# Patient Record
Sex: MALE | Race: BLACK OR AFRICAN AMERICAN | NOT HISPANIC OR LATINO | Employment: UNEMPLOYED | URBAN - METROPOLITAN AREA
[De-identification: names, ages, dates, MRNs, and addresses within clinical notes are randomized per-mention and may not be internally consistent; named-entity substitution may affect disease eponyms.]

---

## 2023-01-23 ENCOUNTER — HOSPITAL ENCOUNTER (INPATIENT)
Facility: HOSPITAL | Age: 63
LOS: 4 days | Discharge: NURSING, REHAB OR LONG-TERM CARE | DRG: 064 | End: 2023-01-27
Attending: EMERGENCY MEDICINE | Admitting: INTERNAL MEDICINE
Payer: MEDICAID

## 2023-01-23 DIAGNOSIS — R41.82 ALTERED MENTAL STATUS, UNSPECIFIED ALTERED MENTAL STATUS TYPE: ICD-10-CM

## 2023-01-23 DIAGNOSIS — N39.0 ACUTE LOWER UTI: ICD-10-CM

## 2023-01-23 DIAGNOSIS — I63.9 ACUTE CVA (CEREBROVASCULAR ACCIDENT): Primary | ICD-10-CM

## 2023-01-23 LAB
AMMONIA PLAS-SCNC: 32 UMOL/L (ref 10–50)
AMPHET+METHAMPHET UR QL: NEGATIVE
BACTERIA #/AREA URNS HPF: ABNORMAL /HPF
BARBITURATES UR QL SCN>200 NG/ML: NEGATIVE
BENZODIAZ UR QL SCN>200 NG/ML: NEGATIVE
BILIRUB UR QL STRIP: NEGATIVE
BZE UR QL SCN: NEGATIVE
CANNABINOIDS UR QL SCN: NEGATIVE
CLARITY UR: ABNORMAL
COLOR UR: YELLOW
CREAT UR-MCNC: 258.5 MG/DL (ref 23–375)
ETHANOL SERPL-MCNC: <10 MG/DL
GLUCOSE UR QL STRIP: NEGATIVE
HGB UR QL STRIP: ABNORMAL
HYALINE CASTS #/AREA URNS LPF: 5 /LPF
KETONES UR QL STRIP: NEGATIVE
LACTATE SERPL-SCNC: 1 MMOL/L (ref 0.5–2.2)
LEUKOCYTE ESTERASE UR QL STRIP: ABNORMAL
METHADONE UR QL SCN>300 NG/ML: NEGATIVE
MICROSCOPIC COMMENT: ABNORMAL
NITRITE UR QL STRIP: POSITIVE
OPIATES UR QL SCN: NEGATIVE
PCP UR QL SCN>25 NG/ML: NEGATIVE
PH UR STRIP: 6 [PH] (ref 5–8)
PROT UR QL STRIP: ABNORMAL
RBC #/AREA URNS HPF: 16 /HPF (ref 0–4)
SP GR UR STRIP: 1.02 (ref 1–1.03)
TOXICOLOGY INFORMATION: NORMAL
URN SPEC COLLECT METH UR: ABNORMAL
UROBILINOGEN UR STRIP-ACNC: ABNORMAL EU/DL
WBC #/AREA URNS HPF: >100 /HPF (ref 0–5)
WBC CLUMPS URNS QL MICRO: ABNORMAL

## 2023-01-23 PROCEDURE — 87088 URINE BACTERIA CULTURE: CPT | Performed by: EMERGENCY MEDICINE

## 2023-01-23 PROCEDURE — 81000 URINALYSIS NONAUTO W/SCOPE: CPT | Mod: 59 | Performed by: EMERGENCY MEDICINE

## 2023-01-23 PROCEDURE — 87077 CULTURE AEROBIC IDENTIFY: CPT | Performed by: EMERGENCY MEDICINE

## 2023-01-23 PROCEDURE — 87086 URINE CULTURE/COLONY COUNT: CPT | Performed by: EMERGENCY MEDICINE

## 2023-01-23 PROCEDURE — 96361 HYDRATE IV INFUSION ADD-ON: CPT

## 2023-01-23 PROCEDURE — 80307 DRUG TEST PRSMV CHEM ANLYZR: CPT | Performed by: EMERGENCY MEDICINE

## 2023-01-23 PROCEDURE — 25000003 PHARM REV CODE 250: Performed by: EMERGENCY MEDICINE

## 2023-01-23 PROCEDURE — 87186 SC STD MICRODIL/AGAR DIL: CPT | Performed by: EMERGENCY MEDICINE

## 2023-01-23 PROCEDURE — 82077 ASSAY SPEC XCP UR&BREATH IA: CPT | Performed by: EMERGENCY MEDICINE

## 2023-01-23 PROCEDURE — 11000001 HC ACUTE MED/SURG PRIVATE ROOM

## 2023-01-23 PROCEDURE — 63600175 PHARM REV CODE 636 W HCPCS: Performed by: EMERGENCY MEDICINE

## 2023-01-23 PROCEDURE — 80053 COMPREHEN METABOLIC PANEL: CPT | Performed by: EMERGENCY MEDICINE

## 2023-01-23 PROCEDURE — 82140 ASSAY OF AMMONIA: CPT | Performed by: EMERGENCY MEDICINE

## 2023-01-23 PROCEDURE — 83605 ASSAY OF LACTIC ACID: CPT | Performed by: EMERGENCY MEDICINE

## 2023-01-23 PROCEDURE — 84484 ASSAY OF TROPONIN QUANT: CPT | Performed by: EMERGENCY MEDICINE

## 2023-01-23 PROCEDURE — 87040 BLOOD CULTURE FOR BACTERIA: CPT | Performed by: EMERGENCY MEDICINE

## 2023-01-23 PROCEDURE — 96365 THER/PROPH/DIAG IV INF INIT: CPT

## 2023-01-23 RX ADMIN — SODIUM CHLORIDE 1000 ML: 0.9 INJECTION, SOLUTION INTRAVENOUS at 09:01

## 2023-01-23 RX ADMIN — CEFTRIAXONE 1 G: 1 INJECTION, POWDER, FOR SOLUTION INTRAMUSCULAR; INTRAVENOUS at 11:01

## 2023-01-24 PROBLEM — I63.9 ACUTE CVA (CEREBROVASCULAR ACCIDENT): Status: ACTIVE | Noted: 2023-01-24

## 2023-01-24 PROBLEM — N39.0 UTI (URINARY TRACT INFECTION): Status: ACTIVE | Noted: 2023-01-24

## 2023-01-24 PROBLEM — I10 UNCONTROLLED HYPERTENSION: Status: ACTIVE | Noted: 2023-01-24

## 2023-01-24 PROBLEM — G93.41 ACUTE METABOLIC ENCEPHALOPATHY: Status: ACTIVE | Noted: 2023-01-24

## 2023-01-24 PROBLEM — N17.9 AKI (ACUTE KIDNEY INJURY): Status: ACTIVE | Noted: 2023-01-24

## 2023-01-24 LAB
ALBUMIN SERPL BCP-MCNC: 3.9 G/DL (ref 3.5–5.2)
ALP SERPL-CCNC: 69 U/L (ref 55–135)
ALT SERPL W/O P-5'-P-CCNC: 38 U/L (ref 10–44)
ANION GAP SERPL CALC-SCNC: 10 MMOL/L (ref 8–16)
ANION GAP SERPL CALC-SCNC: 17 MMOL/L (ref 8–16)
AST SERPL-CCNC: 46 U/L (ref 10–40)
BASOPHILS # BLD AUTO: 0.04 K/UL (ref 0–0.2)
BASOPHILS NFR BLD: 0.6 % (ref 0–1.9)
BILIRUB SERPL-MCNC: 1.3 MG/DL (ref 0.1–1)
BUN SERPL-MCNC: 14 MG/DL (ref 8–23)
BUN SERPL-MCNC: 15 MG/DL (ref 8–23)
CALCIUM SERPL-MCNC: 9 MG/DL (ref 8.7–10.5)
CALCIUM SERPL-MCNC: 9.8 MG/DL (ref 8.7–10.5)
CHLORIDE SERPL-SCNC: 102 MMOL/L (ref 95–110)
CHLORIDE SERPL-SCNC: 107 MMOL/L (ref 95–110)
CO2 SERPL-SCNC: 21 MMOL/L (ref 23–29)
CO2 SERPL-SCNC: 23 MMOL/L (ref 23–29)
CREAT SERPL-MCNC: 1.4 MG/DL (ref 0.5–1.4)
CREAT SERPL-MCNC: 1.5 MG/DL (ref 0.5–1.4)
DIFFERENTIAL METHOD: ABNORMAL
EOSINOPHIL # BLD AUTO: 0.2 K/UL (ref 0–0.5)
EOSINOPHIL NFR BLD: 2.2 % (ref 0–8)
ERYTHROCYTE [DISTWIDTH] IN BLOOD BY AUTOMATED COUNT: 12.8 % (ref 11.5–14.5)
EST. GFR  (NO RACE VARIABLE): 53 ML/MIN/1.73 M^2
EST. GFR  (NO RACE VARIABLE): 57 ML/MIN/1.73 M^2
GLUCOSE SERPL-MCNC: 77 MG/DL (ref 70–110)
GLUCOSE SERPL-MCNC: 95 MG/DL (ref 70–110)
HCT VFR BLD AUTO: 37.5 % (ref 40–54)
HGB BLD-MCNC: 12.6 G/DL (ref 14–18)
IMM GRANULOCYTES # BLD AUTO: 0.01 K/UL (ref 0–0.04)
IMM GRANULOCYTES NFR BLD AUTO: 0.1 % (ref 0–0.5)
LYMPHOCYTES # BLD AUTO: 1.8 K/UL (ref 1–4.8)
LYMPHOCYTES NFR BLD: 26.1 % (ref 18–48)
MCH RBC QN AUTO: 29.5 PG (ref 27–31)
MCHC RBC AUTO-ENTMCNC: 33.6 G/DL (ref 32–36)
MCV RBC AUTO: 88 FL (ref 82–98)
MONOCYTES # BLD AUTO: 1.1 K/UL (ref 0.3–1)
MONOCYTES NFR BLD: 16.2 % (ref 4–15)
NEUTROPHILS # BLD AUTO: 3.7 K/UL (ref 1.8–7.7)
NEUTROPHILS NFR BLD: 54.8 % (ref 38–73)
NRBC BLD-RTO: 0 /100 WBC
PLATELET # BLD AUTO: 161 K/UL (ref 150–450)
PMV BLD AUTO: 10.7 FL (ref 9.2–12.9)
POTASSIUM SERPL-SCNC: 3.6 MMOL/L (ref 3.5–5.1)
POTASSIUM SERPL-SCNC: 3.8 MMOL/L (ref 3.5–5.1)
PROT SERPL-MCNC: 8.5 G/DL (ref 6–8.4)
RBC # BLD AUTO: 4.27 M/UL (ref 4.6–6.2)
SODIUM SERPL-SCNC: 140 MMOL/L (ref 136–145)
SODIUM SERPL-SCNC: 140 MMOL/L (ref 136–145)
TROPONIN I SERPL DL<=0.01 NG/ML-MCNC: 0.02 NG/ML (ref 0–0.03)
WBC # BLD AUTO: 6.77 K/UL (ref 3.9–12.7)

## 2023-01-24 PROCEDURE — 99499 NO LOS: ICD-10-PCS | Mod: 95,,, | Performed by: PSYCHIATRY & NEUROLOGY

## 2023-01-24 PROCEDURE — 63600175 PHARM REV CODE 636 W HCPCS: Performed by: INTERNAL MEDICINE

## 2023-01-24 PROCEDURE — 80061 LIPID PANEL: CPT | Performed by: NURSE PRACTITIONER

## 2023-01-24 PROCEDURE — 25000003 PHARM REV CODE 250: Performed by: NURSE PRACTITIONER

## 2023-01-24 PROCEDURE — 25000003 PHARM REV CODE 250: Performed by: INTERNAL MEDICINE

## 2023-01-24 PROCEDURE — 99499 UNLISTED E&M SERVICE: CPT | Mod: 95,,, | Performed by: PSYCHIATRY & NEUROLOGY

## 2023-01-24 PROCEDURE — 80048 BASIC METABOLIC PNL TOTAL CA: CPT | Performed by: INTERNAL MEDICINE

## 2023-01-24 PROCEDURE — 21400001 HC TELEMETRY ROOM

## 2023-01-24 PROCEDURE — 96361 HYDRATE IV INFUSION ADD-ON: CPT

## 2023-01-24 PROCEDURE — 11000001 HC ACUTE MED/SURG PRIVATE ROOM

## 2023-01-24 PROCEDURE — 83036 HEMOGLOBIN GLYCOSYLATED A1C: CPT | Performed by: NURSE PRACTITIONER

## 2023-01-24 PROCEDURE — 99291 CRITICAL CARE FIRST HOUR: CPT | Mod: 25

## 2023-01-24 PROCEDURE — 85025 COMPLETE CBC W/AUTO DIFF WBC: CPT | Performed by: EMERGENCY MEDICINE

## 2023-01-24 PROCEDURE — 96374 THER/PROPH/DIAG INJ IV PUSH: CPT

## 2023-01-24 RX ORDER — CLOPIDOGREL BISULFATE 75 MG/1
75 TABLET ORAL DAILY
Status: DISCONTINUED | OUTPATIENT
Start: 2023-01-25 | End: 2023-01-27 | Stop reason: HOSPADM

## 2023-01-24 RX ORDER — GUAIFENESIN 100 MG/5ML
200 SOLUTION ORAL EVERY 4 HOURS PRN
Status: DISCONTINUED | OUTPATIENT
Start: 2023-01-24 | End: 2023-01-27 | Stop reason: HOSPADM

## 2023-01-24 RX ORDER — SODIUM CHLORIDE 9 MG/ML
INJECTION, SOLUTION INTRAVENOUS CONTINUOUS
Status: DISCONTINUED | OUTPATIENT
Start: 2023-01-24 | End: 2023-01-24

## 2023-01-24 RX ORDER — HYDRALAZINE HYDROCHLORIDE 20 MG/ML
10 INJECTION INTRAMUSCULAR; INTRAVENOUS EVERY 8 HOURS PRN
Status: DISCONTINUED | OUTPATIENT
Start: 2023-01-24 | End: 2023-01-24

## 2023-01-24 RX ORDER — IPRATROPIUM BROMIDE AND ALBUTEROL SULFATE 2.5; .5 MG/3ML; MG/3ML
3 SOLUTION RESPIRATORY (INHALATION) EVERY 4 HOURS PRN
Status: DISCONTINUED | OUTPATIENT
Start: 2023-01-24 | End: 2023-01-27 | Stop reason: HOSPADM

## 2023-01-24 RX ORDER — MAG HYDROX/ALUMINUM HYD/SIMETH 200-200-20
30 SUSPENSION, ORAL (FINAL DOSE FORM) ORAL EVERY 6 HOURS PRN
Status: DISCONTINUED | OUTPATIENT
Start: 2023-01-24 | End: 2023-01-27 | Stop reason: HOSPADM

## 2023-01-24 RX ORDER — ACETAMINOPHEN 325 MG/1
650 TABLET ORAL EVERY 6 HOURS PRN
Status: DISCONTINUED | OUTPATIENT
Start: 2023-01-24 | End: 2023-01-27 | Stop reason: HOSPADM

## 2023-01-24 RX ORDER — NAPROXEN SODIUM 220 MG/1
81 TABLET, FILM COATED ORAL DAILY
Status: DISCONTINUED | OUTPATIENT
Start: 2023-01-25 | End: 2023-01-27 | Stop reason: HOSPADM

## 2023-01-24 RX ORDER — SODIUM CHLORIDE 9 MG/ML
INJECTION, SOLUTION INTRAVENOUS CONTINUOUS
Status: DISCONTINUED | OUTPATIENT
Start: 2023-01-24 | End: 2023-01-25

## 2023-01-24 RX ORDER — HYDRALAZINE HYDROCHLORIDE 20 MG/ML
10 INJECTION INTRAMUSCULAR; INTRAVENOUS EVERY 4 HOURS PRN
Status: DISCONTINUED | OUTPATIENT
Start: 2023-01-24 | End: 2023-01-25

## 2023-01-24 RX ORDER — ONDANSETRON 2 MG/ML
4 INJECTION INTRAMUSCULAR; INTRAVENOUS EVERY 8 HOURS PRN
Status: DISCONTINUED | OUTPATIENT
Start: 2023-01-24 | End: 2023-01-27 | Stop reason: HOSPADM

## 2023-01-24 RX ORDER — AMLODIPINE BESYLATE 10 MG/1
10 TABLET ORAL DAILY
Status: DISCONTINUED | OUTPATIENT
Start: 2023-01-24 | End: 2023-01-24

## 2023-01-24 RX ADMIN — SODIUM CHLORIDE: 9 INJECTION, SOLUTION INTRAVENOUS at 12:01

## 2023-01-24 RX ADMIN — SODIUM CHLORIDE: 9 INJECTION, SOLUTION INTRAVENOUS at 01:01

## 2023-01-24 RX ADMIN — SODIUM CHLORIDE: 9 INJECTION, SOLUTION INTRAVENOUS at 11:01

## 2023-01-24 RX ADMIN — CEFTRIAXONE 1 G: 1 INJECTION, POWDER, FOR SOLUTION INTRAMUSCULAR; INTRAVENOUS at 11:01

## 2023-01-24 RX ADMIN — HYDRALAZINE HYDROCHLORIDE 10 MG: 20 INJECTION, SOLUTION INTRAMUSCULAR; INTRAVENOUS at 12:01

## 2023-01-24 NOTE — ASSESSMENT & PLAN NOTE
-systolic BP elevated at 160-190 range.    -unknown what medications patient takes at home.    -continue IV hydralazine as needed.    -re-evaluate in a.m..    -resume home medications when reconciled.

## 2023-01-24 NOTE — ED NOTES
Pt urinated in bed. Clean linens and gown placed. External male catheter placed and attached to wall suction

## 2023-01-24 NOTE — ASSESSMENT & PLAN NOTE
Patient with acute kidney injury likely due to IVVD/dehydration RAGHAVENDRA is currently worsening. Labs reviewed- Renal function/electrolytes with Estimated Creatinine Clearance: 44.8 mL/min (A) (based on SCr of 1.5 mg/dL (H)). according to latest data. Monitor urine output and serial BMP and adjust therapy as needed. Avoid nephrotoxins and renally dose meds for GFR listed above.     -creatinine elevated 1.5 (unknown baseline).    -secondary to UTI.    -continue NS at 125 cc/hour.    -repeat labs in a

## 2023-01-24 NOTE — SUBJECTIVE & OBJECTIVE
History reviewed. No pertinent past medical history.    History reviewed. No pertinent surgical history.    Review of patient's allergies indicates:  No Known Allergies    No current facility-administered medications on file prior to encounter.     No current outpatient medications on file prior to encounter.     Family History    Reviewed and Not Pertinent       Tobacco Use    Smoking status: Unknown    Smokeless tobacco: Not on file   Substance and Sexual Activity    Alcohol use: Not on file    Drug use: Not on file    Sexual activity: Not on file     Review of Systems   Unable to perform ROS: Mental status change   Constitutional:  Negative for fever.   Gastrointestinal:  Negative for abdominal pain.   Objective:     Vital Signs (Most Recent):  Temp: 97.7 °F (36.5 °C) (01/23/23 2018)  Pulse: 87 (01/24/23 0024)  Resp: 16 (01/23/23 2018)  BP: (!) 193/115 (01/24/23 0024)  SpO2: 98 % (01/24/23 0024)   Vital Signs (24h Range):  Temp:  [97.7 °F (36.5 °C)] 97.7 °F (36.5 °C)  Pulse:  [87-94] 87  Resp:  [16] 16  SpO2:  [95 %-98 %] 98 %  BP: (171-193)/() 193/115     Weight: 61.2 kg (135 lb)  Body mass index is 19.94 kg/m².    Physical Exam  Vitals and nursing note reviewed.   Constitutional:       General: He is awake. He is not in acute distress.     Appearance: He is ill-appearing.      Comments: Appears confused and disheveled.  Rooms smell of strong urine odor.  No family at the bedside.   HENT:      Head: Normocephalic and atraumatic.      Mouth/Throat:      Mouth: Mucous membranes are dry.   Eyes:      General: No scleral icterus.     Conjunctiva/sclera: Conjunctivae normal.   Cardiovascular:      Rate and Rhythm: Normal rate and regular rhythm.      Heart sounds: No murmur heard.  Pulmonary:      Effort: Pulmonary effort is normal. No respiratory distress.      Breath sounds: Normal breath sounds. No wheezing.   Abdominal:      Palpations: Abdomen is soft.      Tenderness: There is no abdominal tenderness.    Musculoskeletal:         General: No swelling. Normal range of motion.   Skin:     General: Skin is warm.      Coloration: Skin is not jaundiced.   Neurological:      General: No focal deficit present.      Mental Status: He is alert. He is disoriented.      Comments: Alert to place, but not to time.   Psychiatric:         Behavior: Behavior is cooperative.           Significant Labs: All pertinent labs within the past 24 hours have been reviewed.  BMP:   Recent Labs   Lab 01/23/23  2205   GLU 77      K 3.8      CO2 21*   BUN 15   CREATININE 1.5*   CALCIUM 9.8     CBC:   Recent Labs   Lab 01/24/23  0024   WBC 6.77   HGB 12.6*   HCT 37.5*        CMP:   Recent Labs   Lab 01/23/23  2205      K 3.8      CO2 21*   GLU 77   BUN 15   CREATININE 1.5*   CALCIUM 9.8   PROT 8.5*   ALBUMIN 3.9   BILITOT 1.3*   ALKPHOS 69   AST 46*   ALT 38   ANIONGAP 17*     Urine Studies:   Recent Labs   Lab 01/23/23 2218   COLORU Yellow   APPEARANCEUA Hazy*   PHUR 6.0   SPECGRAV 1.020   PROTEINUA 1+*   GLUCUA Negative   KETONESU Negative   BILIRUBINUA Negative   OCCULTUA Trace*   NITRITE Positive*   UROBILINOGEN 4.0-6.0*   LEUKOCYTESUR 3+*   RBCUA 16*   WBCUA >100*   BACTERIA Many*   HYALINECASTS 5*       Significant Imaging: I have reviewed all pertinent imaging results/findings within the past 24 hours.  I have reviewed and interpreted all pertinent imaging results/findings within the past 24 hours.    Imaging Results              X-Ray Chest AP Portable (Final result)  Result time 01/23/23 22:17:50      Final result by Lisa Grewal MD (01/23/23 22:17:50)                   Impression:      No active finding      Electronically signed by: Lisa Grewal  Date:    01/23/2023  Time:    22:17               Narrative:    EXAMINATION:  XR CHEST AP PORTABLE    CLINICAL HISTORY:  altered mental status;    TECHNIQUE:  Single frontal portable view of the chest was  performed.    COMPARISON:  None    FINDINGS:  Lungs are well aerated.  Mediastinal contour normal                                       CT Head Without Contrast (Final result)  Result time 01/23/23 21:34:51      Final result by Lisa Grewal MD (01/23/23 21:34:51)                   Impression:      no acute intracranial finding      Electronically signed by: Lisa Grewal  Date:    01/23/2023  Time:    21:34               Narrative:    EXAMINATION:  CT HEAD WITHOUT CONTRAST    CLINICAL HISTORY:  Mental status change, unknown cause;    COMPARISON:  None.    FINDINGS:  Automated exposure control technique utilized for diminishing radiation exposure dose iterative technique    No acute intracranial hemorrhage or mass effect.  Maxillary sinusitis likely chronic.  No displaced skull fracture chronic microangiopathic change likely                                    I have independently reviewed and interpreted the EKG.     I have independently reviewed all pertinent labs within the past 24 hours.    I have independently reviewed, visualized and interpreted all pertinent imaging results within the past 24 hours and discussed the findings with the ED physician, John Carlson Jr., MD

## 2023-01-24 NOTE — ASSESSMENT & PLAN NOTE
-likely secondary to severe UTI.    -CT head negative for acute intracranial pathology.    -neurochecks every 4 hours.    -re-evaluate in a.m..

## 2023-01-24 NOTE — PHARMACY MED REC
"Admission Medication History     The home medication history was taken by Pollo Mann.    You may go to "Admission" then "Reconcile Home Medications" tabs to review and/or act upon these items.     The home medication list has been updated by the Pharmacy department.   Please read ALL comments highlighted in yellow.   Please address this information as you see fit.    Feel free to contact us if you have any questions or require assistance.      Medications listed below were obtained from: Analytic software- [employer] and Medical records  (Not in a hospital admission)        Pollo Mann  [id number]    No current outpatient medications on file prior to encounter.                           .        "

## 2023-01-24 NOTE — HPI
Mr. Ortiz is a 62-year-old  male, with unknown medical problems, was brought to the ED by EMS after he was found wandering in the street, confused.  No family members at the bedside.  Patient is a very poor historian, unable to obtain much information out of him.  Patient appears disheveled, room smells of strong urine odor.  CT head is negative for acute intracranial pathology.  UA reveals many WBC clumps, many bacteria, greater than 100 WBCs, 3+ leukocytes, positive nitrites and esterases.  UDS is unremarkable.  Chemistry profile reveals evidence of RAGHAVENDRA, creatinine 1.5, unknown baseline.  Anion gap 17, CO2 21.  CBC is unremarkable.  Started on IV fluids, received IV Rocephin empirically.  BP elevated 171/110.      Admitting diagnosis: RAGHAVENDRA.  Urinary tract infection.  Acute metabolic encephalopathy.

## 2023-01-24 NOTE — ASSESSMENT & PLAN NOTE
-no indwelling catheter present.    -UA reviewed.    -Rocephin 1 g IV b.i.d..    -follow up on cultures.

## 2023-01-24 NOTE — CARE UPDATE
The patient is a 63 yo male with no medical problems who was placed in observation for Acute metabolic encephalopathy, RAGHAVENDRA, UTI, and uncontrolled HTN on IVFs and IV Rocephin. Blood cultures show NGTD and urine culture pending. AMS mildly improved- Oriented to person and place but disoriented to time/date. Pt does remember sleeping in his friend's yard and waking up to ambulance. He does not recall how/why he was there. 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 He drove out of state/back one week ago.   CT head showed nothing acute. UDS negative, Alcohol level negative. Denies alcohol/drug use.   Cont current treatment for UTI.Cont IVFs for RAGHAVENDRA. BP mildly elevated- will add Amlodipine. Cont IV Hydralazine. Will obtain MRI brain.

## 2023-01-24 NOTE — ED PROVIDER NOTES
Encounter Date: 1/23/2023       History     Chief Complaint   Patient presents with    Altered Mental Status     Pt found laying in the road with no complaints, knows name and date of birth, does not know year or recent events. Pt unable to stand per ems     HPI  62-year-old  male found in the middle of the street by EMS unable to stand and confused.  Patient is alert and oriented to his person and place however he is unsure of the year who the president is.  He is unsure of what transpired as well.  Denies any drug or alcohol use.  He is a very poor historian.  He denies all constitutional symptoms.  Patient does smell of urine that appears to be somewhat disheveled.    Review of patient's allergies indicates:  No Known Allergies  History reviewed. No pertinent past medical history.  History reviewed. No pertinent surgical history.  History reviewed. No pertinent family history.  Social History     Tobacco Use    Smoking status: Unknown     Review of Systems   Constitutional:  Negative for chills and fever.   HENT:  Negative for rhinorrhea and sore throat.    Respiratory:  Negative for cough and shortness of breath.    Cardiovascular:  Negative for chest pain and palpitations.   Gastrointestinal:  Negative for abdominal pain, diarrhea, nausea and vomiting.   Genitourinary:  Negative for dysuria and frequency.   Musculoskeletal:  Negative for arthralgias and myalgias.   Neurological:  Negative for dizziness, speech difficulty and light-headedness.   Psychiatric/Behavioral:  Positive for confusion. Negative for agitation.    All other systems reviewed and are negative.    Physical Exam     Initial Vitals [01/23/23 2018]   BP Pulse Resp Temp SpO2   (!) 178/95 94 16 97.7 °F (36.5 °C) 95 %      MAP       --         Physical Exam   Nursing Notes and Vital Signs Reviewed.  Constitutional: Patient is in no acute distress. Well-developed and well-nourished.  Patient smells of foul urine.  Patient is alert  and oriented to person and place but not time or circumstance.  Head: Atraumatic. Normocephalic.  Eyes:  EOM intact.  No scleral icterus.  ENT: Mucous membranes are moist.  Nares clear   Neck:  Full ROM. No JVD.  Cardiovascular: Regular rate. Regular rhythm No murmurs, rubs, or gallops. Distal pulses are 2+ and symmetric  Pulmonary/Chest: No respiratory distress. Clear to auscultation bilaterally. No wheezing or rales.  Equal chest wall rise bilaterally  Abdominal: Soft and non-distended.  There is no tenderness.  No rebound, guarding, or rigidity. Good bowel sounds.  Genitourinary: No CVA tenderness.  No suprapubic tenderness  Musculoskeletal: Moves all extremities. No obvious deformities.  5 x 5 strength in all extremities   Skin: Warm and dry.  Neurological:  Alert, awake, and appropriate.  Normal speech.  No acute focal neurological deficits are appreciated.  Two through 12 intact bilaterally.  Psychiatric: Normal affect. Good eye contact. Appropriate in content.    ED Course   Critical Care    Date/Time: 1/23/2023 11:29 PM  Performed by: John Carlson Jr., MD  Authorized by: John Carlson Jr., MD   Direct patient critical care time: 16 minutes  Additional history critical care time: 4 minutes  Ordering / reviewing critical care time: 6 minutes  Documentation critical care time: 10 minutes  Consulting other physicians critical care time: 5 minutes  Total critical care time (exclusive of procedural time) : 41 minutes  Critical care time was exclusive of separately billable procedures and treating other patients and teaching time.  Critical care was necessary to treat or prevent imminent or life-threatening deterioration of the following conditions: AMS and UTI.  Critical care was time spent personally by me on the following activities: blood draw for specimens, development of treatment plan with patient or surrogate, discussions with consultants, interpretation of cardiac output measurements, evaluation of  patient's response to treatment, examination of patient, obtaining history from patient or surrogate, ordering and performing treatments and interventions, ordering and review of laboratory studies, pulse oximetry, ordering and review of radiographic studies, re-evaluation of patient's condition and review of old charts.      Labs Reviewed   COMPREHENSIVE METABOLIC PANEL - Abnormal; Notable for the following components:       Result Value    CO2 21 (*)     Creatinine 1.5 (*)     Total Protein 8.5 (*)     Total Bilirubin 1.3 (*)     AST 46 (*)     Anion Gap 17 (*)     eGFR 53 (*)     All other components within normal limits   URINALYSIS, REFLEX TO URINE CULTURE - Abnormal; Notable for the following components:    Appearance, UA Hazy (*)     Protein, UA 1+ (*)     Occult Blood UA Trace (*)     Nitrite, UA Positive (*)     Urobilinogen, UA 4.0-6.0 (*)     Leukocytes, UA 3+ (*)     All other components within normal limits    Narrative:     Specimen Source->Urine   URINALYSIS MICROSCOPIC - Abnormal; Notable for the following components:    RBC, UA 16 (*)     WBC, UA >100 (*)     WBC Clumps, UA Many (*)     Bacteria Many (*)     Hyaline Casts, UA 5 (*)     All other components within normal limits    Narrative:     Specimen Source->Urine   CBC W/ AUTO DIFFERENTIAL - Abnormal; Notable for the following components:    RBC 4.27 (*)     Hemoglobin 12.6 (*)     Hematocrit 37.5 (*)     Mono # 1.1 (*)     Mono % 16.2 (*)     All other components within normal limits   CULTURE, BLOOD   CULTURE, BLOOD   CULTURE, URINE   LACTIC ACID, PLASMA   DRUG SCREEN PANEL, URINE EMERGENCY    Narrative:     Specimen Source->Urine   TROPONIN I   ALCOHOL,MEDICAL (ETHANOL)   AMMONIA   CBC W/ AUTO DIFFERENTIAL   TSH          Imaging Results              X-Ray Chest AP Portable (Final result)  Result time 01/23/23 22:17:50      Final result by Lisa Grewal MD (01/23/23 22:17:50)                   Impression:      No active  finding      Electronically signed by: Lisa Grewal  Date:    01/23/2023  Time:    22:17               Narrative:    EXAMINATION:  XR CHEST AP PORTABLE    CLINICAL HISTORY:  altered mental status;    TECHNIQUE:  Single frontal portable view of the chest was performed.    COMPARISON:  None    FINDINGS:  Lungs are well aerated.  Mediastinal contour normal                                       CT Head Without Contrast (Final result)  Result time 01/23/23 21:34:51      Final result by Lisa Grewal MD (01/23/23 21:34:51)                   Impression:      no acute intracranial finding      Electronically signed by: Lisa Grewal  Date:    01/23/2023  Time:    21:34               Narrative:    EXAMINATION:  CT HEAD WITHOUT CONTRAST    CLINICAL HISTORY:  Mental status change, unknown cause;    COMPARISON:  None.    FINDINGS:  Automated exposure control technique utilized for diminishing radiation exposure dose iterative technique    No acute intracranial hemorrhage or mass effect.  Maxillary sinusitis likely chronic.  No displaced skull fracture chronic microangiopathic change likely                                    12:50 AM: Discussed case with Sue Strange NP ([facility]). Dr. Warren agrees with current care and management of pt and accepts admission.   Admitting Service: Hospital Medicine  Admitting Physician: Dr. Warren  Admit to: obs tele    12:50 AM: Re-evaluated pt. I have discussed test results, shared treatment plan, and the need for admission with patient and family at bedside. Pt and family express understanding at this time and agree with all information. All questions answered. Pt and family have no further questions or concerns at this time. Pt is ready for admit.      Medications   acetaminophen tablet 650 mg (has no administration in time range)   ondansetron injection 4 mg (has no administration in time range)   guaiFENesin 100 mg/5 ml syrup 200 mg (has no administration in  time range)   aluminum-magnesium hydroxide-simethicone 200-200-20 mg/5 mL suspension 30 mL (has no administration in time range)   albuterol-ipratropium 2.5 mg-0.5 mg/3 mL nebulizer solution 3 mL (has no administration in time range)   0.9%  NaCl infusion (has no administration in time range)   hydrALAZINE injection 10 mg (10 mg Intravenous Given 1/24/23 0043)   cefTRIAXone (ROCEPHIN) 1 g in dextrose 5 % in water (D5W) 5 % 50 mL IVPB (MB+) (has no administration in time range)   sodium chloride 0.9% bolus 1,000 mL 1,000 mL (0 mLs Intravenous Stopped 1/23/23 2215)   cefTRIAXone (ROCEPHIN) 1 g in dextrose 5 % in water (D5W) 5 % 50 mL IVPB (MB+) (0 g Intravenous Stopped 1/24/23 0005)     Medical Decision Making:   Clinical Tests:   Lab Tests: Ordered and Reviewed  Radiological Study: Ordered and Reviewed  Patient presents with altered mental status and confusion with generalized weakness.  Patient is stable.  I ordered and interpreted all laboratory test.  Patient has urinary tract infection with normal white count.  Has a negative head CT and chest x-ray.  He has been dosed with antibiotics and fluids.  There is no evidence of sepsis in light of his negative lactate.  I discussed the case with hospital medicine doctor Kelvin has evaluated the patient and agreed with admission.  Aware of all findings with the patient.                          Clinical Impression:   Final diagnoses:  [R41.82] Altered mental status, unspecified altered mental status type (Primary)  [N39.0] Acute lower UTI        ED Disposition Condition    Observation Stable                John Carlson Jr., MD  01/24/23 014

## 2023-01-25 LAB
ALBUMIN SERPL BCP-MCNC: 3.1 G/DL (ref 3.5–5.2)
ALP SERPL-CCNC: 62 U/L (ref 55–135)
ALT SERPL W/O P-5'-P-CCNC: 26 U/L (ref 10–44)
ANION GAP SERPL CALC-SCNC: 12 MMOL/L (ref 8–16)
AORTIC ROOT ANNULUS: 3.65 CM
ASCENDING AORTA: 3.09 CM
AST SERPL-CCNC: 34 U/L (ref 10–40)
AV INDEX (PROSTH): 0.82
AV MEAN GRADIENT: 10 MMHG
AV PEAK GRADIENT: 17 MMHG
AV VALVE AREA: 2.43 CM2
AV VELOCITY RATIO: 0.75
BASOPHILS # BLD AUTO: 0.04 K/UL (ref 0–0.2)
BASOPHILS NFR BLD: 0.8 % (ref 0–1.9)
BILIRUB SERPL-MCNC: 0.6 MG/DL (ref 0.1–1)
BSA FOR ECHO PROCEDURE: 1.73 M2
BUN SERPL-MCNC: 14 MG/DL (ref 8–23)
CALCIUM SERPL-MCNC: 8.9 MG/DL (ref 8.7–10.5)
CHLORIDE SERPL-SCNC: 113 MMOL/L (ref 95–110)
CHOLEST SERPL-MCNC: 143 MG/DL (ref 120–199)
CHOLEST/HDLC SERPL: 3.4 {RATIO} (ref 2–5)
CO2 SERPL-SCNC: 16 MMOL/L (ref 23–29)
CREAT SERPL-MCNC: 1.2 MG/DL (ref 0.5–1.4)
CV ECHO LV RWT: 0.67 CM
DIFFERENTIAL METHOD: ABNORMAL
DOP CALC AO PEAK VEL: 2.09 M/S
DOP CALC AO VTI: 39.8 CM
DOP CALC LVOT AREA: 3 CM2
DOP CALC LVOT DIAMETER: 1.94 CM
DOP CALC LVOT PEAK VEL: 1.56 M/S
DOP CALC LVOT STROKE VOLUME: 96.61 CM3
DOP CALC RVOT PEAK VEL: 1.03 M/S
DOP CALC RVOT VTI: 22.4 CM
DOP CALCLVOT PEAK VEL VTI: 32.7 CM
E WAVE DECELERATION TIME: 267.09 MSEC
E/A RATIO: 0.78
E/E' RATIO: 6 M/S
ECHO LV POSTERIOR WALL: 1.5 CM (ref 0.6–1.1)
EJECTION FRACTION: 65 %
EOSINOPHIL # BLD AUTO: 0.2 K/UL (ref 0–0.5)
EOSINOPHIL NFR BLD: 4.2 % (ref 0–8)
ERYTHROCYTE [DISTWIDTH] IN BLOOD BY AUTOMATED COUNT: 12.8 % (ref 11.5–14.5)
EST. GFR  (NO RACE VARIABLE): >60 ML/MIN/1.73 M^2
ESTIMATED AVG GLUCOSE: 114 MG/DL (ref 68–131)
FRACTIONAL SHORTENING: 41 % (ref 28–44)
GLUCOSE SERPL-MCNC: 91 MG/DL (ref 70–110)
HBA1C MFR BLD: 5.6 % (ref 4–5.6)
HCT VFR BLD AUTO: 37.7 % (ref 40–54)
HDLC SERPL-MCNC: 42 MG/DL (ref 40–75)
HDLC SERPL: 29.4 % (ref 20–50)
HGB BLD-MCNC: 12.5 G/DL (ref 14–18)
IMM GRANULOCYTES # BLD AUTO: 0.01 K/UL (ref 0–0.04)
IMM GRANULOCYTES NFR BLD AUTO: 0.2 % (ref 0–0.5)
INTERVENTRICULAR SEPTUM: 1.71 CM (ref 0.6–1.1)
IVC DIAMETER: 1.49 CM
IVRT: 79.92 MSEC
LA MAJOR: 4.7 CM
LA MINOR: 5.2 CM
LA WIDTH: 3.7 CM
LDLC SERPL CALC-MCNC: 89.2 MG/DL (ref 63–159)
LEFT ATRIUM SIZE: 3.42 CM
LEFT ATRIUM VOLUME INDEX: 30.3 ML/M2
LEFT ATRIUM VOLUME: 53.11 CM3
LEFT INTERNAL DIMENSION IN SYSTOLE: 2.66 CM (ref 2.1–4)
LEFT VENTRICLE DIASTOLIC VOLUME INDEX: 52.9 ML/M2
LEFT VENTRICLE DIASTOLIC VOLUME: 92.58 ML
LEFT VENTRICLE MASS INDEX: 175 G/M2
LEFT VENTRICLE SYSTOLIC VOLUME INDEX: 14.8 ML/M2
LEFT VENTRICLE SYSTOLIC VOLUME: 25.96 ML
LEFT VENTRICULAR INTERNAL DIMENSION IN DIASTOLE: 4.5 CM (ref 3.5–6)
LEFT VENTRICULAR MASS: 306.1 G
LV LATERAL E/E' RATIO: 4.71 M/S
LV SEPTAL E/E' RATIO: 8.25 M/S
LVOT MG: 5.56 MMHG
LVOT MV: 1.13 CM/S
LYMPHOCYTES # BLD AUTO: 1.7 K/UL (ref 1–4.8)
LYMPHOCYTES NFR BLD: 34.1 % (ref 18–48)
MAGNESIUM SERPL-MCNC: 1.8 MG/DL (ref 1.6–2.6)
MCH RBC QN AUTO: 29.2 PG (ref 27–31)
MCHC RBC AUTO-ENTMCNC: 33.2 G/DL (ref 32–36)
MCV RBC AUTO: 88 FL (ref 82–98)
MONOCYTES # BLD AUTO: 0.9 K/UL (ref 0.3–1)
MONOCYTES NFR BLD: 17.9 % (ref 4–15)
MV PEAK A VEL: 0.85 M/S
MV PEAK E VEL: 0.66 M/S
MV STENOSIS PRESSURE HALF TIME: 77.46 MS
MV VALVE AREA P 1/2 METHOD: 2.84 CM2
NEUTROPHILS # BLD AUTO: 2.1 K/UL (ref 1.8–7.7)
NEUTROPHILS NFR BLD: 42.8 % (ref 38–73)
NONHDLC SERPL-MCNC: 101 MG/DL
NRBC BLD-RTO: 0 /100 WBC
PISA MRMAX VEL: 4.2 M/S
PISA TR MAX VEL: 2.23 M/S
PLATELET # BLD AUTO: 166 K/UL (ref 150–450)
PMV BLD AUTO: 11.6 FL (ref 9.2–12.9)
POTASSIUM SERPL-SCNC: 3.8 MMOL/L (ref 3.5–5.1)
PROT SERPL-MCNC: 7.1 G/DL (ref 6–8.4)
PV MEAN GRADIENT: 2.38 MMHG
RA MAJOR: 4.05 CM
RA PRESSURE: 3 MMHG
RA WIDTH: 2.94 CM
RBC # BLD AUTO: 4.28 M/UL (ref 4.6–6.2)
SINUS: 3.55 CM
SODIUM SERPL-SCNC: 141 MMOL/L (ref 136–145)
STJ: 3.27 CM
TDI LATERAL: 0.14 M/S
TDI SEPTAL: 0.08 M/S
TDI: 0.11 M/S
TR MAX PG: 20 MMHG
TRICUSPID ANNULAR PLANE SYSTOLIC EXCURSION: 2.26 CM
TRIGL SERPL-MCNC: 59 MG/DL (ref 30–150)
TV REST PULMONARY ARTERY PRESSURE: 23 MMHG
WBC # BLD AUTO: 4.98 K/UL (ref 3.9–12.7)

## 2023-01-25 PROCEDURE — 36415 COLL VENOUS BLD VENIPUNCTURE: CPT | Performed by: INTERNAL MEDICINE

## 2023-01-25 PROCEDURE — 21400001 HC TELEMETRY ROOM

## 2023-01-25 PROCEDURE — 92610 EVALUATE SWALLOWING FUNCTION: CPT

## 2023-01-25 PROCEDURE — 97161 PT EVAL LOW COMPLEX 20 MIN: CPT

## 2023-01-25 PROCEDURE — 25000003 PHARM REV CODE 250: Performed by: INTERNAL MEDICINE

## 2023-01-25 PROCEDURE — 97530 THERAPEUTIC ACTIVITIES: CPT

## 2023-01-25 PROCEDURE — 83735 ASSAY OF MAGNESIUM: CPT | Performed by: INTERNAL MEDICINE

## 2023-01-25 PROCEDURE — 63600175 PHARM REV CODE 636 W HCPCS: Performed by: NURSE PRACTITIONER

## 2023-01-25 PROCEDURE — 97166 OT EVAL MOD COMPLEX 45 MIN: CPT

## 2023-01-25 PROCEDURE — 92523 SPEECH SOUND LANG COMPREHEN: CPT

## 2023-01-25 PROCEDURE — 85025 COMPLETE CBC W/AUTO DIFF WBC: CPT | Performed by: INTERNAL MEDICINE

## 2023-01-25 PROCEDURE — 80053 COMPREHEN METABOLIC PANEL: CPT | Performed by: INTERNAL MEDICINE

## 2023-01-25 RX ORDER — HYDRALAZINE HYDROCHLORIDE 20 MG/ML
5 INJECTION INTRAMUSCULAR; INTRAVENOUS EVERY 6 HOURS PRN
Status: DISCONTINUED | OUTPATIENT
Start: 2023-01-25 | End: 2023-01-27 | Stop reason: HOSPADM

## 2023-01-25 RX ORDER — ATORVASTATIN CALCIUM 40 MG/1
40 TABLET, FILM COATED ORAL DAILY
Status: DISCONTINUED | OUTPATIENT
Start: 2023-01-25 | End: 2023-01-27 | Stop reason: HOSPADM

## 2023-01-25 RX ADMIN — ASPIRIN 81 MG CHEWABLE TABLET 81 MG: 81 TABLET CHEWABLE at 08:01

## 2023-01-25 RX ADMIN — HYDRALAZINE HYDROCHLORIDE 10 MG: 20 INJECTION INTRAMUSCULAR; INTRAVENOUS at 05:01

## 2023-01-25 RX ADMIN — CLOPIDOGREL BISULFATE 75 MG: 75 TABLET ORAL at 08:01

## 2023-01-25 RX ADMIN — ATORVASTATIN CALCIUM 40 MG: 40 TABLET, FILM COATED ORAL at 11:01

## 2023-01-25 NOTE — PLAN OF CARE
PT GT TRAINED X 140' WITH CGA WITH ANTALGIC GT AND DEC SAFETY / SPATIAL AWARENESS. P.T. REC INPT REHAB @ D/C

## 2023-01-25 NOTE — PLAN OF CARE
OT eduar completed. CGA for bed mobility, t/fs with no AD, and ambulation 140ft with no AD. Pt with decreased safety awareness and coordination. Pt with deficits on R side. Recommends rehab.

## 2023-01-25 NOTE — CARE UPDATE
Telestroke Service    Called about patient who presented nearly 24 hrs ago with confusion of unknown onset and found to have UTI.  MRI today is limited by motion and artifact but appears to demonstrate an acute R caudate (+/- subcortical WM/lentiform) infarct.  She is not a thrombolytic nor interventional candidate (due to time and demonstrated infarct on MRI).  Of note, this infarct would not likely explain the encephalopathy which is more likely due to infection. Agree with plan for ASA/clopidogrel (the latter for 21 days).  Rec FLP, HgA1c, CTA head/neck (if Cr continues to decline), echo, DVT prophylaxis, neurology consultation in AM (local or virtual).    Alok Pulido MD

## 2023-01-25 NOTE — PLAN OF CARE
[facility] - Med Surg  Initial Discharge Assessment       Primary Care Provider: Primary Doctor No    Admission Diagnosis: Acute lower UTI [N39.0]  Altered mental status, unspecified altered mental status type [R41.82]    Admission Date: 1/23/2023  Expected Discharge Date: Per Attending     Discharge Barriers Identified: Homeless    Payor: MEDICAID / Plan: [facility] COMMUNITY PLAN [facility] ([place] MEDICAID) / Product Type: Managed Medicaid /     No emergency contact information on file.    Discharge Plan A: Rehab  Discharge Plan B: Skilled Nursing Facility    No Pharmacies Listed    Initial Assessment (most recent)       Adult Discharge Assessment - 01/25/23 0934          Discharge Assessment    Confirmed/corrected address, phone number and insurance Yes     Confirmed Demographics --   Pt currently homeless; address on facesheet is an abandoned house    Source of Information patient     Reason For Admission acute CVA     Prior to hospitilization cognitive status: Alert/Oriented     Current cognitive status: Alert/Oriented     Readmission within 30 days? No     Do you currently have service(s) that help you manage your care at home? No     Do you take prescription medications? No     Who is going to help you get home at discharge? Medicaid transportation     How do you get to doctors appointments? health plan transportation     Are you on dialysis? No     Do you take coumadin? No     Discharge Plan A Rehab     Discharge Plan B Skilled Nursing Facility     DME Needed Upon Discharge  none     Discharge Plan discussed with: Patient     Discharge Barriers Identified Homeless                 Sw met with pt who confirmed he is currently homeless. Pt is open to SNF vs Rehab placement pending PT/OT recs. Sw to follow up with pt as needed for d/c planning.

## 2023-01-25 NOTE — PLAN OF CARE
01/25/23 1301   Post-Acute Status   Post-Acute Authorization Placement   Post-Acute Placement Status Referrals Sent   Discharge Plan   Discharge Plan A Rehab     Sw met with pt this afternoon to discuss rehab placement upon d/c. Pt is agreeable and pt consented for Sw to send mass referrals in the  area; pt doesn't have a rehab facility preference at this time.

## 2023-01-25 NOTE — ASSESSMENT & PLAN NOTE
no indwelling catheter present on admission  UA positive for nitrite and leukocyte esterase  Urine culture in process  Continue empiric treatment with IV ceftriaxone 2 g daily

## 2023-01-25 NOTE — ASSESSMENT & PLAN NOTE
Systolic BP elevated at 160-190 range.    Unknown what medications patient takes at home.    Given acute CVA noted on MRI brain, allow permissive hypertension  IV hydralazine 5 mg as needed for SBP > 180

## 2023-01-25 NOTE — PT/OT/SLP EVAL
Physical Therapy Evaluation    Patient Name:  Dalia Ortiz   MRN:  [id number]    Recommendations:     Discharge Recommendations: rehabilitation facility   Discharge Equipment Recommendations: none   Barriers to discharge: Inaccessible home and Decreased caregiver support    Assessment:     Dalia Ortiz is a 62 y.o. male admitted with a medical diagnosis of Acute CVA (cerebrovascular accident).  He presents with the following impairments/functional limitations: weakness, gait instability, impaired balance, impaired endurance, impaired cognition, impaired self care skills, impaired functional mobility, decreased coordination, pain, decreased safety awareness, decreased lower extremity function, decreased upper extremity function, impaired coordination, impaired fine motor .    Rehab Prognosis: Good; patient would benefit from acute skilled PT services to address these deficits and reach maximum level of function.    Recent Surgery: * No surgery found *      Plan:     During this hospitalization, patient to be seen 3 x/week to address the identified rehab impairments via gait training, therapeutic activities, therapeutic exercises and progress toward the following goals:    Plan of Care Expires:  02/08/23    Subjective     Chief Complaint: ABD PAIN   Patient/Family Comments/goals: NONE STATED   Pain/Comfort:  Pain Rating 1: 5/10  Location 1: abdomen  Pain Rating Post-Intervention 1: 5/10    Patients cultural, spiritual, Confucianist conflicts given the current situation:      Living Environment:   PT REPORTED HE LIVED ALONE IN A ONE STORY HOME HOWEVER MD PER NOTE DOCUMENTED IN CHART PT IS HOMELESS  Prior to admission, patients level of function was IND AND DRIVES .  Equipment used at home: none.  DME owned (not currently used): none.  Upon discharge, patient will have assistance from UNKNOWN .    Objective:     Communicated with NURSE KEYON GONZALEZ AND [facility] CHART REVIEW  prior to session.  Patient found supine  AND  "SOILED with peripheral IV, telemetry  upon PT entry to room.    General Precautions: Standard, fall  Orthopedic Precautions:N/A   Braces: N/A  Respiratory Status: Room air    Exams:  Cognitive Exam:  Patient is oriented to Person and Place  Fine Motor Coordination:    -       Impaired  Right hand thumb/finger opposition skills IMPAIRED   Gross Motor Coordination:  IMPAIRED  Postural Exam:  Patient presented with the following abnormalities:    -       Rounded shoulders  -       Forward head  RLE ROM: WFL  RLE Strength: LIMITED 4/5  LLE ROM: WFL  LLE Strength: WNL    Functional Mobility:  Bed Mobility:     Supine to Sit: supervision  Transfers:     Sit to Stand:  contact guard assistance with no AD  Bed to Chair: contact guard assistance with  no AD  using  Stand Pivot  Gait: PT GT TRAINED X 140' WITH CGA WITH ANTALGIC GT PATTERN      AM-PAC 6 CLICK MOBILITY  Total Score:16       Treatment & Education:  PT SEATED EOB FOR GROOMING WITH MIN A AND DONNED CLEAN GOWN AS PT WAS SOILED. PT BUMPED INTO OBJECT ON RIGHT SIDE WITH GT HOWEVER WHEN VISUAL ASSESSMENT COMPLETED PT WFL. PT SCANNED ENVIRONMENT WITH GT WITH CGA AND SLOW PACE AND LACK OF SPATIAL AWARENESS OF RIGHT SIDE. PT RETURNED TO  T/F TO CHAIR WITH CGA AND VERBAL CUES FOR SAFETY AND EDUCATED ON ROLE OF P.T. PT EDUCATED ON RISK FOR FALLS DUE TO GENERALIZED WEAKNESS, EDUCATED ON "CALL DON'T FALL", ENCOURAGED TO CALL FOR ASSISTANCE WITH ALL NEEDS SUCH AS BED<>CHAIR TRANSFERS OR TRIPS TO BATHROOM. PT REPORTED UNDERSTANDING AND ABLE TO LOCATE CALL BELL APPROPRIATELY.     Patient left up in chair with call button in reach and chair alarm on.    GOALS:   Multidisciplinary Problems       Physical Therapy Goals          Problem: Physical Therapy    Goal Priority Disciplines Outcome Goal Variances Interventions   Physical Therapy Goal     PT, PT/OT      Description: LT23  1. PT WILL COMPLETE BED MOBILITY IND  2. PT WILL T/F TO CHAIR IND  3. PT WILL GT TRAIN X 500' " WITH NO AD IND WHILE SCANNING ENVIRONMENT TO PROGRESS FUNC MOBILITY                        History:     History reviewed. No pertinent past medical history.    History reviewed. No pertinent surgical history.    Time Tracking:     PT Received On: 01/25/23  PT Start Time: 1112     PT Stop Time: 1137  PT Total Time (min): 25 min     Billable Minutes: Evaluation 15 and Therapeutic Activity 10      01/25/2023

## 2023-01-25 NOTE — PROGRESS NOTES
[facility] Medicine  Progress Note    Patient Name: Dalia Ortiz  MRN: [id number]  Patient Class: IP- Inpatient   Admission Date: 1/23/2023  Length of Stay: 1 days  Attending Physician: Emmy Kaur DO  Primary Care Provider: Primary Doctor No        Subjective:     Principal Problem:Acute CVA (cerebrovascular accident)        HPI:  Mr. Ortiz is a 62-year-old  male, with unknown medical problems, was brought to the ED by EMS after he was found wandering in the street, confused.  No family members at the bedside.  Patient is a very poor historian, unable to obtain much information out of him.  Patient appears disheveled, room smells of strong urine odor.  CT head is negative for acute intracranial pathology.  UA reveals many WBC clumps, many bacteria, greater than 100 WBCs, 3+ leukocytes, positive nitrites and esterases.  UDS is unremarkable.  Chemistry profile reveals evidence of RAGHAVENDRA, creatinine 1.5, unknown baseline.  Anion gap 17, CO2 21.  CBC is unremarkable.  Started on IV fluids, received IV Rocephin empirically.  BP elevated 171/110.      Admitting diagnosis: RAGHAVENDRA.  Urinary tract infection.  Acute metabolic encephalopathy.      Overview/Hospital Course:  MRI brain obtained showed findings concerning for right caudate and lentiform nucleus lacunar infarcts, right maxillary sinus near complete opacification.  Neurology consulted, patient started on aspirin, Plavix, high-intensity statin.  PT/OT/SLP consult placed.  TTE pending.  Given encephalopathy and lack of any family present, patient has been made full code.      Interval History:  No acute events overnight.  Seen and examined without any family present.  Patient intermittently groaning during evaluation, however denies any pain.  Not answering all questions.  He knows his name and that he is in the hospital, states that he had just returned from working in [place] and saw his son and daughter about 1-2 weeks ago after he  returned, however they do not live with him at his house.    Review of Systems  Objective:     Vital Signs (Most Recent):  Temp: 97.6 °F (36.4 °C) (01/25/23 0732)  Pulse: 68 (01/25/23 0925)  Resp: 19 (01/25/23 0732)  BP: (!) 171/93 (01/25/23 0732)  SpO2: 98 % (01/25/23 0732)   Vital Signs (24h Range):  Temp:  [97.6 °F (36.4 °C)-98.5 °F (36.9 °C)] 97.6 °F (36.4 °C)  Pulse:  [68-83] 68  Resp:  [17-20] 19  SpO2:  [95 %-99 %] 98 %  BP: (156-192)/() 171/93     Weight: 61.2 kg (135 lb)  Body mass index is 19.94 kg/m².    Intake/Output Summary (Last 24 hours) at 1/25/2023 1130  Last data filed at 1/24/2023 2110  Gross per 24 hour   Intake 432.59 ml   Output 300 ml   Net 132.59 ml      Physical Exam  Vitals and nursing note reviewed.   Constitutional:       General: He is not in acute distress.  HENT:      Head: Normocephalic and atraumatic.      Right Ear: External ear normal.      Left Ear: External ear normal.      Nose: Nose normal.      Mouth/Throat:      Mouth: Mucous membranes are moist.   Eyes:      Extraocular Movements: Extraocular movements intact.      Pupils: Pupils are equal, round, and reactive to light.   Cardiovascular:      Rate and Rhythm: Normal rate and regular rhythm.   Pulmonary:      Effort: Pulmonary effort is normal. No respiratory distress.      Breath sounds: No wheezing.      Comments: On room air  Abdominal:      General: Bowel sounds are normal.      Palpations: Abdomen is soft.      Tenderness: There is no abdominal tenderness. There is no guarding or rebound.   Musculoskeletal:      Cervical back: Neck supple.      Right lower leg: No edema.      Left lower leg: No edema.   Skin:     General: Skin is warm and dry.   Neurological:      Mental Status: He is alert.      Comments: Oriented to his name and that he is in hospital however otherwise confused.  Has poor recall of information given just minutes prior.  Difficulty following commands, maintaining train of thoughts.        Significant Labs: All pertinent labs within the past 24 hours have been reviewed.  CBC:   Recent Labs   Lab 01/24/23  0024 01/25/23  0711   WBC 6.77 4.98   HGB 12.6* 12.5*   HCT 37.5* 37.7*    166     CMP:   Recent Labs   Lab 01/23/23  2205 01/24/23  1712 01/25/23  0711    140 141   K 3.8 3.6 3.8    107 113*   CO2 21* 23 16*   GLU 77 95 91   BUN 15 14 14   CREATININE 1.5* 1.4 1.2   CALCIUM 9.8 9.0 8.9   PROT 8.5*  --  7.1   ALBUMIN 3.9  --  3.1*   BILITOT 1.3*  --  0.6   ALKPHOS 69  --  62   AST 46*  --  34   ALT 38  --  26   ANIONGAP 17* 10 12       Significant Imaging: I have reviewed all pertinent imaging results/findings within the past 24 hours.      Assessment/Plan:      * Acute CVA (cerebrovascular accident)  MRI brain revealed findings concerning for right caudate and lentiform nucleus lacunar infarcts  Neurology consulted  Hemoglobin A1c 5.6   Lipid panel  Started on aspirin, Plavix, atorvastatin  Optimize antihypertensive regimen once permissive hypertension is finished  PT/OT/SLP  Continue neuro checks  Case management consulted for rehab referral    RAGHAVENDRA (acute kidney injury)  Patient with acute kidney injury likely due to IVVD/dehydration RAGHAVENDRA is currently worsening. Labs reviewed- Renal function/electrolytes with Estimated Creatinine Clearance: 56 mL/min (based on SCr of 1.2 mg/dL). according to latest data. Monitor urine output and serial BMP and adjust therapy as needed. Avoid nephrotoxins and renally dose meds for GFR listed above.     Creatinine elevated 1.5 on admission (unknown baseline)  Improved with IV fluid  Possibly has a degree of CKD    Uncontrolled hypertension  Systolic BP elevated at 160-190 range.    Unknown what medications patient takes at home.    Given acute CVA noted on MRI brain, allow permissive hypertension  IV hydralazine 5 mg as needed for SBP > 180      UTI (urinary tract infection)  no indwelling catheter present on admission  UA positive for nitrite  and leukocyte esterase  Urine culture in process  Continue empiric treatment with IV ceftriaxone 2 g daily      Acute metabolic encephalopathy  likely multifactorial secondary to severe UTI and acute CVA  see plan for UTI and CVA        VTE Risk Mitigation (From admission, onward)         Ordered     Place sequential compression device  Until discontinued         01/24/23 0031                Discharge Planning   MICHELE:      Code Status: Full Code   Is the patient medically ready for discharge?:     Reason for patient still in hospital (select all that apply): Patient trending condition, Treatment and Pending disposition  Discharge Plan A: Rehab                  Emmy Kaur DO  Department of Hospital Medicine   [facility] - Med Surg

## 2023-01-25 NOTE — PT/OT/SLP EVAL
Occupational Therapy   Evaluation    Name: Dalia Ortiz  MRN: [id number]  Admitting Diagnosis: Acute CVA (cerebrovascular accident)  Recent Surgery: * No surgery found *      Recommendations:     Discharge Recommendations: rehabilitation facility  Discharge Equipment Recommendations:  none  Barriers to discharge:  Inaccessible home environment, Decreased caregiver support    Assessment:     Dalia Ortiz is a 62 y.o. male with a medical diagnosis of Acute CVA (cerebrovascular accident).  He presents with the following performance deficits affecting function: weakness, impaired endurance, impaired self care skills, impaired functional mobility, gait instability, impaired balance, impaired cognition, decreased coordination, decreased upper extremity function, decreased lower extremity function, decreased safety awareness, pain, impaired coordination, impaired fine motor.      Rehab Prognosis: Good; patient would benefit from acute skilled OT services to address these deficits and reach maximum level of function.       Plan:     Patient to be seen 2 x/week to address the above listed problems via self-care/home management, therapeutic activities, therapeutic exercises  Plan of Care Expires: 02/08/23  Plan of Care Reviewed with: patient    Subjective     Chief Complaint: stomach pain  Patient/Family Comments/goals: none reported    Occupational Profile:  Living Environment: pt reports he lives alone in a 1 story house with steps to enter. However, per MD note in chart, pt is homeless.  Previous level of function: Pt (I) with ADLs and functional mobility.  Roles and Routines: pt drives and works as a crane/  Equipment Used at Home: none  Assistance upon Discharge: unknown    Pain/Comfort:  Pain Rating 1: 5/10  Location 1: abdomen  Pain Addressed 1: Distraction    Objective:     Communicated with: Nurse and epic chart review prior to session.  Patient found supine with peripheral IV, telemetry upon OT  entry to room.    General Precautions: Standard, fall  Orthopedic Precautions: N/A  Braces: N/A  Respiratory Status: Room air    Occupational Performance:    Bed Mobility:    Patient completed Rolling/Turning to Right with contact guard assistance  Patient completed Scooting/Bridging with contact guard assistance  Patient completed Supine to Sit with contact guard assistance    Functional Mobility/Transfers:  Patient completed Sit <> Stand Transfer with contact guard assistance  with  no assistive device   Patient completed Bed <> Chair Transfer using Stand Pivot technique with contact guard assistance with no assistive device  Functional Mobility: Patient completed x140ft functional mobility with CGA and no AD to increase dynamic standing balance and activity tolerance needed for ADL completion.     Activities of Daily Living:  Feeding:  setup for container management.  Upper Body Dressing: minimum assistance doff dirty gown, beatris clean gowns to front and around back.  Lower Body Dressing: stand by assistance doff/beatris socks in chair  Toileting: stand by assistance pt found soiled upon therapist arrival, pt able to clean front and manage clothing.    Cognitive/Visual Perceptual:  Cognitive/Psychosocial Skills:     -       Oriented to: Person and Place   -       Follows Commands/attention:Follows two-step commands  -       Memory: Poor immediate recall  -       Safety awareness/insight to disability: impaired   Visual/Perceptual:      -Impaired  pt with decreased spatial awareness and attention .  Pt confused.    Physical Exam:  Sensation:    -       Intact  pt reports no numbness/tingling  Dominant hand:    -       right  Upper Extremity Range of Motion:     -       Right Upper Extremity: WNL  -       Left Upper Extremity: WNL  Upper Extremity Strength:    -       Right Upper Extremity: 4/5 grossly  -       Left Upper Extremity: 5/5 grossly   Strength:    -       Right Upper Extremity: fair  -       Left Upper  Extremity: WFL  Fine Motor Coordination:    -       Impaired  Right hand thumb/finger opposition skills slow movement and decreased accuracy with touching individual pads on fingers and Right hand, diadochokinesis skill able to perform slowly but decreased coordination with faster movements.  Gross motor coordination:   impaired    AMPAC 6 Click ADL:  AMPAC Total Score: 20    Treatment & Education:  Pt with R lateral lean when sitting EOB, able to correct with verbal cueing. Pt also demonstrated R drift while ambulating, bumping into object on R side. However, pt performed visual scanning to right side while ambulating per therapist instruction, and pt WFL with R side vision. Pt appears to have impaired spatial awareness of R side and decreased attention.  Patient educated on role of OT in acute setting and benefits of participation. Educated on techniques to use to increase independence and decrease fall risk with functional transfers. Educated on importance of OOB activity and calling for A to transfer back to bed. Encouraged completion of B UE AROM therex throughout the day to tolerance to increase functional strength and activity tolerance. Patient stated understanding and in agreement with POC.     Pt would benefit from intensive therapies in an inpatient setting with 3 hours of therapy/day. Pt's needs cannot be met at a lower level of care. Pt is motivated to progress. Rehabilitation facility recommended to return patient to PLOF, prevent future falls and decrease readmission risk.      Patient left up in chair with all lines intact, call button in reach, chair alarm on, and nurse notified    GOALS:   Multidisciplinary Problems       Occupational Therapy Goals          Problem: Occupational Therapy    Goal Priority Disciplines Outcome Interventions   Occupational Therapy Goal     OT, PT/OT     Description: Goals to be met by: 2/8/23     Patient will increase functional independence with ADLs by  performing:    Toileting from toilet with Modified [place] for hygiene and clothing management.   Stand pivot transfers with [place].  Upper extremity exercise program x20 reps, with independence.                         History:     History reviewed. No pertinent past medical history.    History reviewed. No pertinent surgical history.    Time Tracking:     OT Date of Treatment: 01/25/23  OT Start Time: 1110  OT Stop Time: 1135  OT Total Time (min): 25 min    Billable Minutes:Evaluation 15  Therapeutic Activity 10    1/25/2023  Noemi Toney OT

## 2023-01-25 NOTE — ASSESSMENT & PLAN NOTE
Patient with acute kidney injury likely due to IVVD/dehydration RAGHAVENDRA is currently worsening. Labs reviewed- Renal function/electrolytes with Estimated Creatinine Clearance: 56 mL/min (based on SCr of 1.2 mg/dL). according to latest data. Monitor urine output and serial BMP and adjust therapy as needed. Avoid nephrotoxins and renally dose meds for GFR listed above.     Creatinine elevated 1.5 on admission (unknown baseline)  Improved with IV fluid  Possibly has a degree of CKD

## 2023-01-25 NOTE — PLAN OF CARE
Pt remains free from falls/injuries this shift. Safety precautions maintained. Pain managed with relaxation and rest. Patient up in chair, ambulating in room. Tele monitoring per orders. No s/s of acute distress noted. Will continue to monitor. Chart check completed.

## 2023-01-25 NOTE — ASSESSMENT & PLAN NOTE
MRI brain revealed findings concerning for right caudate and lentiform nucleus lacunar infarcts  Neurology consulted  Hemoglobin A1c 5.6   Lipid panel  Started on aspirin, Plavix, atorvastatin  Optimize antihypertensive regimen once permissive hypertension is finished  PT/OT/SLP  Continue neuro checks  Case management consulted for rehab referral

## 2023-01-25 NOTE — HOSPITAL COURSE
MRI brain obtained showed findings concerning for right caudate and lentiform nucleus lacunar infarcts, right maxillary sinus near complete opacification.  Neurology consulted, patient started on aspirin, Plavix, high-intensity statin.  PT/OT/SLP consulted and recommend rehab placement.  TTE obtained.  Given encephalopathy and lack of any family present, patient was made full code.  Patient's confusion appears to be greater than what should be seen with CVA as well as UTI.  However unfortunately we do not know what patient's baseline is and there is no family available to contact.  Subsequently they recommended EEG be done which showed no evidence of epileptic waveforms.  Although patient is awake and alert, he is only oriented to self most of the time although sometimes he recalls that he is in the hospital.  Continues to have some aphasia case management consulted for rehab referral.  Urine culture grew pansensitive E coli, patient transitioned to Keflex prior to discharge.  Patient accepted at [facility], insurance authorization obtained.  Patient seen examined on the day of discharge.  He is agreeable to discharge plan.  All questions answered to his satisfaction.  Stable for discharge to rehab this time.

## 2023-01-25 NOTE — PLAN OF CARE
Problem: Adult Inpatient Plan of Care  Goal: Plan of Care Review  Outcome: Ongoing, Progressing  Goal: Patient-Specific Goal (Individualized)  Outcome: Ongoing, Progressing  Goal: Absence of Hospital-Acquired Illness or Injury  Outcome: Ongoing, Progressing  Goal: Optimal Comfort and Wellbeing  Outcome: Ongoing, Progressing  Goal: Readiness for Transition of Care  Outcome: Ongoing, Progressing     Problem: Fluid and Electrolyte Imbalance (Acute Kidney Injury/Impairment)  Goal: Fluid and Electrolyte Balance  Outcome: Ongoing, Progressing     Problem: Oral Intake Inadequate (Acute Kidney Injury/Impairment)  Goal: Optimal Nutrition Intake  Outcome: Ongoing, Progressing     Problem: Renal Function Impairment (Acute Kidney Injury/Impairment)  Goal: Effective Renal Function  Outcome: Ongoing, Progressing     Problem: Pain Acute  Goal: Acceptable Pain Control and Functional Ability  Outcome: Ongoing, Progressing     Problem: Fall Injury Risk  Goal: Absence of Fall and Fall-Related Injury  Outcome: Ongoing, Progressing      0

## 2023-01-25 NOTE — PLAN OF CARE
01/25/23 1530   Post-Acute Status   Post-Acute Authorization Placement   Post-Acute Placement Status Pending payor review/awaiting authorization (if required)   Discharge Plan   Discharge Plan A Rehab     Adal notified by Kena at [facility] that they have accepted pt and would like to begin insurance auth process.     Adal met with pt this afternoon and he is agreeable to d/c to [facility].     Adal notified Kena of pt's rehab selection. Kena stated she will submit for insurance auth today.

## 2023-01-25 NOTE — SUBJECTIVE & OBJECTIVE
Interval History:  No acute events overnight.  Seen and examined without any family present.  Patient intermittently groaning during evaluation, however denies any pain.  Not answering all questions.  He knows his name and that he is in the hospital, states that he had just returned from working in [place] and saw his son and daughter about 1-2 weeks ago after he returned, however they do not live with him at his house.    Review of Systems  Objective:     Vital Signs (Most Recent):  Temp: 97.6 °F (36.4 °C) (01/25/23 0732)  Pulse: 68 (01/25/23 0925)  Resp: 19 (01/25/23 0732)  BP: (!) 171/93 (01/25/23 0732)  SpO2: 98 % (01/25/23 0732)   Vital Signs (24h Range):  Temp:  [97.6 °F (36.4 °C)-98.5 °F (36.9 °C)] 97.6 °F (36.4 °C)  Pulse:  [68-83] 68  Resp:  [17-20] 19  SpO2:  [95 %-99 %] 98 %  BP: (156-192)/() 171/93     Weight: 61.2 kg (135 lb)  Body mass index is 19.94 kg/m².    Intake/Output Summary (Last 24 hours) at 1/25/2023 1130  Last data filed at 1/24/2023 2110  Gross per 24 hour   Intake 432.59 ml   Output 300 ml   Net 132.59 ml      Physical Exam  Vitals and nursing note reviewed.   Constitutional:       General: He is not in acute distress.  HENT:      Head: Normocephalic and atraumatic.      Right Ear: External ear normal.      Left Ear: External ear normal.      Nose: Nose normal.      Mouth/Throat:      Mouth: Mucous membranes are moist.   Eyes:      Extraocular Movements: Extraocular movements intact.      Pupils: Pupils are equal, round, and reactive to light.   Cardiovascular:      Rate and Rhythm: Normal rate and regular rhythm.   Pulmonary:      Effort: Pulmonary effort is normal. No respiratory distress.      Breath sounds: No wheezing.      Comments: On room air  Abdominal:      General: Bowel sounds are normal.      Palpations: Abdomen is soft.      Tenderness: There is no abdominal tenderness. There is no guarding or rebound.   Musculoskeletal:      Cervical back: Neck supple.      Right  lower leg: No edema.      Left lower leg: No edema.   Skin:     General: Skin is warm and dry.   Neurological:      Mental Status: He is alert.      Comments: Oriented to his name and that he is in hospital however otherwise confused.  Has poor recall of information given just minutes prior.  Difficulty following commands, maintaining train of thoughts.       Significant Labs: All pertinent labs within the past 24 hours have been reviewed.  CBC:   Recent Labs   Lab 01/24/23  0024 01/25/23  0711   WBC 6.77 4.98   HGB 12.6* 12.5*   HCT 37.5* 37.7*    166     CMP:   Recent Labs   Lab 01/23/23  2205 01/24/23  1712 01/25/23  0711    140 141   K 3.8 3.6 3.8    107 113*   CO2 21* 23 16*   GLU 77 95 91   BUN 15 14 14   CREATININE 1.5* 1.4 1.2   CALCIUM 9.8 9.0 8.9   PROT 8.5*  --  7.1   ALBUMIN 3.9  --  3.1*   BILITOT 1.3*  --  0.6   ALKPHOS 69  --  62   AST 46*  --  34   ALT 38  --  26   ANIONGAP 17* 10 12       Significant Imaging: I have reviewed all pertinent imaging results/findings within the past 24 hours.

## 2023-01-26 PROCEDURE — 99233 SBSQ HOSP IP/OBS HIGH 50: CPT | Mod: 95,,, | Performed by: STUDENT IN AN ORGANIZED HEALTH CARE EDUCATION/TRAINING PROGRAM

## 2023-01-26 PROCEDURE — 21400001 HC TELEMETRY ROOM

## 2023-01-26 PROCEDURE — 97116 GAIT TRAINING THERAPY: CPT

## 2023-01-26 PROCEDURE — 95816 EEG AWAKE AND DROWSY: CPT

## 2023-01-26 PROCEDURE — 63600175 PHARM REV CODE 636 W HCPCS: Performed by: INTERNAL MEDICINE

## 2023-01-26 PROCEDURE — 95816 PR EEG,W/AWAKE & DROWSY RECORD: ICD-10-PCS | Mod: 26,,, | Performed by: PSYCHIATRY & NEUROLOGY

## 2023-01-26 PROCEDURE — 95816 EEG AWAKE AND DROWSY: CPT | Mod: 26,,, | Performed by: PSYCHIATRY & NEUROLOGY

## 2023-01-26 PROCEDURE — 25000003 PHARM REV CODE 250: Performed by: INTERNAL MEDICINE

## 2023-01-26 PROCEDURE — 99233 PR SUBSEQUENT HOSPITAL CARE,LEVL III: ICD-10-PCS | Mod: 95,,, | Performed by: STUDENT IN AN ORGANIZED HEALTH CARE EDUCATION/TRAINING PROGRAM

## 2023-01-26 PROCEDURE — 92526 ORAL FUNCTION THERAPY: CPT

## 2023-01-26 PROCEDURE — 92507 TX SP LANG VOICE COMM INDIV: CPT

## 2023-01-26 PROCEDURE — 97530 THERAPEUTIC ACTIVITIES: CPT

## 2023-01-26 RX ORDER — LOSARTAN POTASSIUM 25 MG/1
25 TABLET ORAL DAILY
Status: DISCONTINUED | OUTPATIENT
Start: 2023-01-26 | End: 2023-01-27

## 2023-01-26 RX ORDER — AMLODIPINE BESYLATE 5 MG/1
5 TABLET ORAL DAILY
Status: DISCONTINUED | OUTPATIENT
Start: 2023-01-26 | End: 2023-01-27

## 2023-01-26 RX ADMIN — CLOPIDOGREL BISULFATE 75 MG: 75 TABLET ORAL at 09:01

## 2023-01-26 RX ADMIN — AMLODIPINE BESYLATE 5 MG: 5 TABLET ORAL at 01:01

## 2023-01-26 RX ADMIN — CEFTRIAXONE 2 G: 2 INJECTION, POWDER, FOR SOLUTION INTRAMUSCULAR; INTRAVENOUS at 02:01

## 2023-01-26 RX ADMIN — CEFTRIAXONE 2 G: 2 INJECTION, POWDER, FOR SOLUTION INTRAMUSCULAR; INTRAVENOUS at 10:01

## 2023-01-26 RX ADMIN — ASPIRIN 81 MG CHEWABLE TABLET 81 MG: 81 TABLET CHEWABLE at 09:01

## 2023-01-26 RX ADMIN — ATORVASTATIN CALCIUM 40 MG: 40 TABLET, FILM COATED ORAL at 09:01

## 2023-01-26 RX ADMIN — HYDRALAZINE HYDROCHLORIDE 5 MG: 20 INJECTION, SOLUTION INTRAMUSCULAR; INTRAVENOUS at 06:01

## 2023-01-26 RX ADMIN — LOSARTAN POTASSIUM 25 MG: 25 TABLET, FILM COATED ORAL at 01:01

## 2023-01-26 NOTE — PLAN OF CARE
01/26/23 1556   Post-Acute Status   Post-Acute Authorization Placement   Post-Acute Placement Status Set-up Complete/Auth obtained   Discharge Plan   Discharge Plan A Rehab     Adal spoke with Kena at [facility]; auth approved.     Adal spoke with Attending MD, Dr. Kaur. Plan for admit first thing in the morning.

## 2023-01-26 NOTE — ASSESSMENT & PLAN NOTE
MRI brain revealed findings concerning for right caudate and lentiform nucleus lacunar infarcts  Neurology consulted  Hemoglobin A1c 5.6   Lipid panel  Started on aspirin, Plavix, atorvastatin  Optimize antihypertensive regimen  PT/OT/SLP  Continue neuro checks  Case management following, patient has been accepted at [facility].  Awaiting insurance authorization

## 2023-01-26 NOTE — ASSESSMENT & PLAN NOTE
MRI brain revealed findings concerning for right caudate and lentiform nucleus lacunar infarcts  Neurology consulted  Hemoglobin A1c 5.6   Lipid panel  Started on aspirin, Plavix, atorvastatin  Optimize antihypertensive regimen  PT/OT/SLP recommends rehab placement  Accepted at [facility]

## 2023-01-26 NOTE — ASSESSMENT & PLAN NOTE
Systolic BP elevated at 160-190 range.    Unknown what medications patient takes at home.    Start amlodipine 5 mg and losartan 25 mg daily  Monitor BP, titrate antihypertensives as needed  IV hydralazine 5 mg as needed for SBP > 180

## 2023-01-26 NOTE — PT/OT/SLP PROGRESS
Speech Language Pathology Treatment    Patient Name:  Dalia Ortiz   MRN:  [id number]  Admitting Diagnosis: Acute CVA (cerebrovascular accident)    Recommendations:                 General Recommendations:  Cognitive-linguistic therapy  Diet recommendations:  Regular Diet - IDDSI Level 7, Liquid Diet Level: Thin liquids - IDDSI Level 0   Aspiration Precautions: Frequent oral care and Standard aspiration precautions   General Precautions: Standard, aspiration  Communication strategies:  provide increased time to answer and cognitive-linguistic deficits present    Subjective     Pt seen bedside for ST.  No c/o pain.  No family present.  Pt awaiting disposition planning.  Patient goals: to go to rehab    Pain/Comfort:  Pain Rating 1: 0/10  Pain Rating Post-Intervention 1: 0/10  Pain Rating 2: 0/10  Pain Rating Post-Intervention 2: 0/10    Respiratory Status: Room air    Objective:     Has the patient been evaluated by SLP for swallowing?   Yes  Keep patient NPO? No   Current Respiratory Status: room air    Pt with impaired topic maintenance, thought organization and sentence formation during /wh/ questioning.  Oriented to person and place today, but not time (month but not year) or situation/event.  Improved basic problem solving (re: call light use/assist).    Pt consuming IDDSI 7 (regular solids) and IDDSI 0 (thin liquids) without incident.    Assessment:     Dalia Ortiz is a 62 y.o. male with an SLP diagnosis of Cognitive-Linguistic Impairment s/p CVA and recommended for continue intervention to improve communicative effectiveness.  He is recommended for IDDSI 7 (regular solids) with IDDSI 0 (thin liquids), following aspiration precautions and post-acute placement/rehab.    Goals:   Multidisciplinary Problems       SLP Goals          Problem: SLP    Goal Priority Disciplines Outcome   SLP Goal     SLP Ongoing, Progressing   Description: 1.  Pt will consume IDDSI 7 (regular solids) with IDDSI 0 (thin liquids)  without incident.  2.  Pt will improve cognitive-linguistic skills to meet functional communicative needs, related to orientation, memory recall and problem solving/reasoning.                       Plan:     Patient to be seen:  2 x/week, 3 x/week   Plan of Care expires:  02/01/23  Plan of Care reviewed with:  patient   SLP Follow-Up:  Yes       Discharge recommendations:  rehabilitation facility   Barriers to Discharge:  None    Time Tracking:     SLP Treatment Date:   01/26/23  Speech Start Time:  1000  Speech Stop Time:  1030     Speech Total Time (min):  30 min    Billable Minutes: Speech Therapy Individual 15 minutes and Treatment Swallowing Dysfunction 15 minutes    01/26/2023

## 2023-01-26 NOTE — CONSULTS
[facility] [facility]  Neurology  Consult Note    Patient Name: Dalia Ortiz  MRN: [id number]  Admission Date: 1/23/2023  Hospital Length of Stay: 2 days  Code Status: Full Code   Attending Provider: Emmy Kaur DO   Consulting Provider: Nkechi Henley MD  Primary Care Physician: Primary Doctor No  Principal Problem:Acute CVA (cerebrovascular accident)    Inpatient consult to [facility] Neurology  Consult performed by: Nkechi Henley MD  Consult ordered by: Emmy Kaur DO      Subjective:     Chief Complaint: encephalopathy     HPI:   Dalia Ortiz is a 62 y.o. male with unknown past medical history who is admitted to [facility] with encephalopathy. Unfortunately, there are no family members on file to be able to gather collateral history. He was wandering in the streets with confused manner and brought in by EMS. MRI revealed right caudate infarct, however, he continues to remain encephalopathic, somewhat out of proportion to the size of infarct. Additionally, he was found to have a UTI and RAGHAVENDRA which is being treated. Neurology is consulted for further investigation.     Current smoker, can not say how many cigarettes he smokes daily. Denies alcohol or drugs.     History reviewed. No pertinent past medical history.    History reviewed. No pertinent surgical history.    Review of patient's allergies indicates:  No Known Allergies    Current Neurological Medications:     No current facility-administered medications on file prior to encounter.     No current outpatient medications on file prior to encounter.      Family History    None       Tobacco Use    Smoking status: Unknown    Smokeless tobacco: Not on file   Substance and Sexual Activity    Alcohol use: Not on file    Drug use: Not on file    Sexual activity: Not on file     Review of Systems   Unable to perform ROS: Mental status change   Constitutional:  Negative for fever.   Gastrointestinal:  Negative for vomiting.   Psychiatric/Behavioral:   Negative for agitation.      Objective:     Vital Signs (Most Recent):  Temp: 97.7 °F (36.5 °C) (23)  Pulse: 73 (23)  Resp: 17 (23)  BP: (!) 177/100 (23)  SpO2: 98 % (23)   Vital Signs (24h Range):  Temp:  [97.6 °F (36.4 °C)-98.7 °F (37.1 °C)] 97.7 °F (36.5 °C)  Pulse:  [69-88] 73  Resp:  [17-19] 17  SpO2:  [96 %-98 %] 98 %  BP: (135-192)/() 177/100     Weight: 86.1 kg (189 lb 13.1 oz)  Body mass index is 28.03 kg/m².    Physical Exam  *exam is limited due to the virtual nature of this consult    Constitutional: Well-developed, well-nourished, NAD  Respiratory: No labored breathing   Neurological:    Mental status: Awake, alert, and oriented to self, , not to time nor situation. Minimal verbal output. Ted phrenic. Follows few one-step commands. Right-left confusion.  Cranial nerves:   CN III, IV, VI: Extraocular movements full, no nystagmus visualized  CN VII: Face strong and symmetric bilaterally   CN VIII: Hearing grossly intact   CN XII: Tongue appears midline   Motor: antigravity power in BUE, bilateral downward drift  Coordination: No dysmetria or tremor with outstretched hands, no asterixis     Significant Labs:   Hemoglobin A1c:   Recent Labs   Lab 23  1712   HGBA1C 5.6     CBC:   Recent Labs   Lab 23  0711   WBC 4.98   HGB 12.5*   HCT 37.7*        CMP:   Recent Labs   Lab 23  0711   GLU 91      K 3.8   *   CO2 16*   BUN 14   CREATININE 1.2   CALCIUM 8.9   MG 1.8   PROT 7.1   ALBUMIN 3.1*   BILITOT 0.6   ALKPHOS 62   AST 34   ALT 26   ANIONGAP 12     Ammonia nl    All pertinent lab results from the past 24 hours have been reviewed.    Significant Imaging: I have reviewed and interpreted all pertinent imaging results/findings within the past 24 hours.  MRI Brain wo showed right caudate acute infarct, however, evaluation is limited by artifact    Assessment and Plan:     Dalia Ortiz is a 62 y.o. male with  unknown past medical history who is admitted to the hospital with encephalopathy. Found to have UTI, RAGHAVENDRA, and right caudate infarct, however, encephalopathy persists out of proportion to CVA and despite treatment with antibiotics and IV fluids. Recommend obtaining EEG to rule out subclinical seizures as a potential etiology to encephalopathy. If he doesn't improve after completing the antibiotic course and in the absence of seizures, I'd recommend LP to rule out CNS infections.    Active Diagnoses:    Diagnosis Date Noted POA    PRINCIPAL PROBLEM:  Acute CVA (cerebrovascular accident) [I63.9] 01/24/2023 Yes    Acute metabolic encephalopathy [G93.41] 01/24/2023 Yes    UTI (urinary tract infection) [N39.0] 01/24/2023 Yes    Uncontrolled hypertension [I10] 01/24/2023 Yes    RAGHAVENDRA (acute kidney injury) [N17.9] 01/24/2023 Yes      Problems Resolved During this Admission:       Thank you for your consult. I will follow-up with patient. Please contact us if you have any additional questions.    Nkechi Henley MD  Neurology  [facility]

## 2023-01-26 NOTE — SUBJECTIVE & OBJECTIVE
Interval History: No acute events overnight.  Seen and examined without any family present.  Patient awake, alert.  Oriented to self and knows he is in the hospital, however does not know the month or year.  Denies any complaints except not getting enough to eat.  Per , has been accepted at [facility], awaiting insurance authorization.  Teleneurology evaluated patient earlier this morning.  Started on amlodipine and losartan for BP control as patient is past permissive hypertension window      Review of Systems   Constitutional:  Negative for chills and fever.   Respiratory:  Negative for cough, shortness of breath and wheezing.    Cardiovascular:  Negative for chest pain and palpitations.   Gastrointestinal:  Negative for abdominal pain, constipation, diarrhea, nausea and vomiting.   Neurological:  Negative for dizziness and headaches.   All other systems reviewed and are negative.  Objective:     Vital Signs (Most Recent):  Temp: 98.2 °F (36.8 °C) (01/26/23 1220)  Pulse: 70 (01/26/23 1220)  Resp: 17 (01/26/23 1220)  BP: (!) 178/100 (01/26/23 1220)  SpO2: 99 % (01/26/23 1220)   Vital Signs (24h Range):  Temp:  [97.6 °F (36.4 °C)-98.7 °F (37.1 °C)] 98.2 °F (36.8 °C)  Pulse:  [69-88] 70  Resp:  [17-19] 17  SpO2:  [96 %-99 %] 99 %  BP: (157-192)/() 178/100     Weight: 86.1 kg (189 lb 13.1 oz)  Body mass index is 28.03 kg/m².    Intake/Output Summary (Last 24 hours) at 1/26/2023 1252  Last data filed at 1/26/2023 0951  Gross per 24 hour   Intake 1294.12 ml   Output 575 ml   Net 719.12 ml      Physical Exam  Vitals and nursing note reviewed.   Constitutional:       General: He is not in acute distress.  HENT:      Head: Normocephalic and atraumatic.      Right Ear: External ear normal.      Left Ear: External ear normal.      Nose: Nose normal.   Eyes:      Extraocular Movements: Extraocular movements intact.      Pupils: Pupils are equal, round, and reactive to light.   Cardiovascular:      Rate and Rhythm:  Normal rate and regular rhythm.   Pulmonary:      Effort: Pulmonary effort is normal. No respiratory distress.      Breath sounds: No wheezing, rhonchi or rales.      Comments: On room air  Abdominal:      General: Bowel sounds are normal.      Palpations: Abdomen is soft.      Tenderness: There is no abdominal tenderness. There is no guarding or rebound.   Musculoskeletal:      Cervical back: Neck supple.   Skin:     General: Skin is warm and dry.   Neurological:      Mental Status: He is alert. Mental status is at baseline.      Cranial Nerves: No cranial nerve deficit.      Comments: Oriented to self and place only       Significant Labs: All pertinent labs within the past 24 hours have been reviewed.  CBC:   Recent Labs   Lab 01/25/23  0711   WBC 4.98   HGB 12.5*   HCT 37.7*        CMP:   Recent Labs   Lab 01/24/23  1712 01/25/23  0711    141   K 3.6 3.8    113*   CO2 23 16*   GLU 95 91   BUN 14 14   CREATININE 1.4 1.2   CALCIUM 9.0 8.9   PROT  --  7.1   ALBUMIN  --  3.1*   BILITOT  --  0.6   ALKPHOS  --  62   AST  --  34   ALT  --  26   ANIONGAP 10 12       Significant Imaging: I have reviewed all pertinent imaging results/findings within the past 24 hours.

## 2023-01-26 NOTE — PLAN OF CARE
Discussed poc with pt, pt verbalized understanding     Purposeful rounding every 2hours     Blood glucose monitoring   Telemetry monitor on pt and intact  Fall precautions in place, remains injury free  Pt denies c/o nausea and pain    Accurate I&Os  Bed locked at lowest position  Call light within reach     Chart check complete  Reviewed active orders  Will continue with POC

## 2023-01-26 NOTE — ASSESSMENT & PLAN NOTE
no indwelling catheter present on admission  UA positive for nitrite and leukocyte esterase  Urine culture growing E coli, awaiting sensitivities  Continue empiric treatment with IV ceftriaxone 2 g daily, deescalate based on final culture

## 2023-01-26 NOTE — PROGRESS NOTES
[facility] Medicine  Progress Note    Patient Name: Dalia Ortiz  MRN: [id number]  Patient Class: IP- Inpatient   Admission Date: 1/23/2023  Length of Stay: 2 days  Attending Physician: Emmy Kaur DO  Primary Care Provider: Primary Doctor No        Subjective:     Principal Problem:Acute CVA (cerebrovascular accident)        HPI:  Mr. Ortiz is a 62-year-old  male, with unknown medical problems, was brought to the ED by EMS after he was found wandering in the street, confused.  No family members at the bedside.  Patient is a very poor historian, unable to obtain much information out of him.  Patient appears disheveled, room smells of strong urine odor.  CT head is negative for acute intracranial pathology.  UA reveals many WBC clumps, many bacteria, greater than 100 WBCs, 3+ leukocytes, positive nitrites and esterases.  UDS is unremarkable.  Chemistry profile reveals evidence of RAGHAVENDRA, creatinine 1.5, unknown baseline.  Anion gap 17, CO2 21.  CBC is unremarkable.  Started on IV fluids, received IV Rocephin empirically.  BP elevated 171/110.      Admitting diagnosis: RAGHAVENDRA.  Urinary tract infection.  Acute metabolic encephalopathy.      Overview/Hospital Course:  MRI brain obtained showed findings concerning for right caudate and lentiform nucleus lacunar infarcts, right maxillary sinus near complete opacification.  Neurology consulted, patient started on aspirin, Plavix, high-intensity statin.  PT/OT/SLP consult placed.  TTE pending.  Given encephalopathy and lack of any family present, patient has been made full code.      Interval History: No acute events overnight.  Seen and examined without any family present.  Patient awake, alert.  Oriented to self and knows he is in the hospital, however does not know the month or year.  Denies any complaints except not getting enough to eat.  Per SW, has been accepted at [facility], awaiting insurance authorization.  Teleneurology evaluated  patient earlier this morning.  Started on amlodipine and losartan for BP control as patient is past permissive hypertension window      Review of Systems   Constitutional:  Negative for chills and fever.   Respiratory:  Negative for cough, shortness of breath and wheezing.    Cardiovascular:  Negative for chest pain and palpitations.   Gastrointestinal:  Negative for abdominal pain, constipation, diarrhea, nausea and vomiting.   Neurological:  Negative for dizziness and headaches.   All other systems reviewed and are negative.  Objective:     Vital Signs (Most Recent):  Temp: 98.2 °F (36.8 °C) (01/26/23 1220)  Pulse: 70 (01/26/23 1220)  Resp: 17 (01/26/23 1220)  BP: (!) 178/100 (01/26/23 1220)  SpO2: 99 % (01/26/23 1220)   Vital Signs (24h Range):  Temp:  [97.6 °F (36.4 °C)-98.7 °F (37.1 °C)] 98.2 °F (36.8 °C)  Pulse:  [69-88] 70  Resp:  [17-19] 17  SpO2:  [96 %-99 %] 99 %  BP: (157-192)/() 178/100     Weight: 86.1 kg (189 lb 13.1 oz)  Body mass index is 28.03 kg/m².    Intake/Output Summary (Last 24 hours) at 1/26/2023 1252  Last data filed at 1/26/2023 0951  Gross per 24 hour   Intake 1294.12 ml   Output 575 ml   Net 719.12 ml      Physical Exam  Vitals and nursing note reviewed.   Constitutional:       General: He is not in acute distress.  HENT:      Head: Normocephalic and atraumatic.      Right Ear: External ear normal.      Left Ear: External ear normal.      Nose: Nose normal.   Eyes:      Extraocular Movements: Extraocular movements intact.      Pupils: Pupils are equal, round, and reactive to light.   Cardiovascular:      Rate and Rhythm: Normal rate and regular rhythm.   Pulmonary:      Effort: Pulmonary effort is normal. No respiratory distress.      Breath sounds: No wheezing, rhonchi or rales.      Comments: On room air  Abdominal:      General: Bowel sounds are normal.      Palpations: Abdomen is soft.      Tenderness: There is no abdominal tenderness. There is no guarding or rebound.    Musculoskeletal:      Cervical back: Neck supple.   Skin:     General: Skin is warm and dry.   Neurological:      Mental Status: He is alert. Mental status is at baseline.      Cranial Nerves: No cranial nerve deficit.      Comments: Oriented to self and place only       Significant Labs: All pertinent labs within the past 24 hours have been reviewed.  CBC:   Recent Labs   Lab 01/25/23  0711   WBC 4.98   HGB 12.5*   HCT 37.7*        CMP:   Recent Labs   Lab 01/24/23  1712 01/25/23  0711    141   K 3.6 3.8    113*   CO2 23 16*   GLU 95 91   BUN 14 14   CREATININE 1.4 1.2   CALCIUM 9.0 8.9   PROT  --  7.1   ALBUMIN  --  3.1*   BILITOT  --  0.6   ALKPHOS  --  62   AST  --  34   ALT  --  26   ANIONGAP 10 12       Significant Imaging: I have reviewed all pertinent imaging results/findings within the past 24 hours.      Assessment/Plan:      * Acute CVA (cerebrovascular accident)  MRI brain revealed findings concerning for right caudate and lentiform nucleus lacunar infarcts  Neurology consulted  Hemoglobin A1c 5.6   Lipid panel  Started on aspirin, Plavix, atorvastatin  Optimize antihypertensive regimen  PT/OT/SLP  Continue neuro checks  Case management following, patient has been accepted at [facility].  Awaiting insurance authorization    RAGHAVENDRA (acute kidney injury)  Patient with acute kidney injury likely due to IVVD/dehydration RAGHAVENDRA is currently improving. Labs reviewed- Renal function/electrolytes with Estimated Creatinine Clearance: 70.3 mL/min (based on SCr of 1.2 mg/dL). according to latest data. Monitor urine output and serial BMP and adjust therapy as needed. Avoid nephrotoxins and renally dose meds for GFR listed above.     Creatinine elevated 1.5 on admission (unknown baseline)  Improved with IV fluid  Possibly has a degree of CKD    Uncontrolled hypertension  Systolic BP elevated at 160-190 range.    Unknown what medications patient takes at home.    Start amlodipine 5 mg and losartan  25 mg daily  Monitor BP, titrate antihypertensives as needed  IV hydralazine 5 mg as needed for SBP > 180      UTI (urinary tract infection)  no indwelling catheter present on admission  UA positive for nitrite and leukocyte esterase  Urine culture growing E coli, awaiting sensitivities  Continue empiric treatment with IV ceftriaxone 2 g daily, deescalate based on final culture      Acute metabolic encephalopathy  likely multifactorial secondary to severe UTI and acute CVA  see plan for UTI and CVA        VTE Risk Mitigation (From admission, onward)         Ordered     Place sequential compression device  Until discontinued         01/24/23 0031                Discharge Planning   MICHELE:      Code Status: Full Code   Is the patient medically ready for discharge?:     Reason for patient still in hospital (select all that apply): Patient trending condition, Treatment and Pending disposition  Discharge Plan A: Rehab                  Emmy Kaur DO  Department of Hospital Medicine   [place] [facility] Surg

## 2023-01-26 NOTE — PT/OT/SLP PROGRESS
Physical Therapy  Treatment    Dalia Ortiz   MRN: [id number]   Admitting Diagnosis: Acute CVA (cerebrovascular accident)    PT Received On: 01/26/23  PT Start Time: 1105     PT Stop Time: 1130    PT Total Time (min): 25 min       Billable Minutes:  Gait Training 15 and Therapeutic Activity 10    Treatment Type: Treatment  PT/PTA: PT     PTA Visit Number: 0       General Precautions: Standard, fall  Orthopedic Precautions: N/A  Braces: N/A  Respiratory Status: Room air         Subjective:  Communicated with NURSE SIGRID AND Epic CHART REVIEW  prior to session.   PT AGREED TO TX WITH INC ENCOURAGEMENT.     Pain/Comfort  Pain Rating 1: 0/10  Pain Rating Post-Intervention 1: 0/10    Objective:   Patient found with: peripheral IV, telemetry    Functional Mobility:  PT MET IN RM SUP IN BED. PT SUP>SIT EOB WITH S. PT SCOOTED TO EOB AND SEATED FOR DONNING GOWN AND STOOD WITH NO AD AND CGA. PT GT TRAINED TO BATHROOM WITH ANTALGIC GT AND TOILETED WITH SBA. PT GT TRAINED IN HALLWAY WITH ANTALGIC GT AND B LE GENU VARUS. PT RETURNED TO RM T/F TO CHAIR WITH CGA AND SEATED FOR REST. Pt  COMPLETED SIT>sTAND 2X5 REPS FOR LE STRENGTHENING WITH NO UE SUPPORT. PT LEFT SEATED IN CHAIR WITH ALL NEEDS MET AND CALL BELL IN REACH.     AM-PAC 6 CLICK MOBILITY  How much help from another person does this patient currently need?   1 = Unable, Total/Dependent Assistance  2 = A lot, Maximum/Moderate Assistance  3 = A little, Minimum/Contact Guard/Supervision  4 = None, Modified [place]/Independent    Turning over in bed (including adjusting bedclothes, sheets and blankets)?: 4  Sitting down on and standing up from a chair with arms (e.g., wheelchair, bedside commode, etc.): 3  Moving from lying on back to sitting on the side of the bed?: 4  Moving to and from a bed to a chair (including a wheelchair)?: 3  Need to walk in hospital room?: 3  Climbing 3-5 steps with a railing?: 1  Basic Mobility Total Score: 18    AM-PAC Raw Score CMS G-Code  Modifier Level of Impairment Assistance   6 % Total / Unable   7 - 9 CM 80 - 100% Maximal Assist   10 - 14 CL 60 - 80% Moderate Assist   15 - 19 CK 40 - 60% Moderate Assist   20 - 22 CJ 20 - 40% Minimal Assist   23 CI 1-20% SBA / CGA   24 CH 0% Independent/ Mod I     Patient left up in chair with call button in reach and chair alarm on.    Assessment:  PT PROGRESSING WITH GROSS FUNC MOBILITY. PT LIMITED D/T SAFETY.     Rehab identified problem list/impairments: weakness, gait instability, impaired balance, impaired endurance, impaired functional mobility, decreased safety awareness, decreased lower extremity function, decreased upper extremity function, impaired coordination, impaired self care skills    Rehab potential is good.    Activity tolerance: Good    Discharge recommendations: rehabilitation facility      Barriers to discharge:      Equipment recommendations: none     GOALS:   Multidisciplinary Problems       Physical Therapy Goals          Problem: Physical Therapy    Goal Priority Disciplines Outcome Goal Variances Interventions   Physical Therapy Goal     PT, PT/OT Ongoing, Progressing     Description: LT23  1. PT WILL COMPLETE BED MOBILITY IND  2. PT WILL T/F TO CHAIR IND  3. PT WILL GT TRAIN X 500' WITH NO AD IND WHILE SCANNING ENVIRONMENT TO PROGRESS FUNC MOBILITY                        PLAN:    Patient to be seen 3 x/week to address the above listed problems via gait training, therapeutic activities, therapeutic exercises  Plan of Care expires: 23  Plan of Care reviewed with: patient         2023

## 2023-01-26 NOTE — PT/OT/SLP EVAL
Speech Language Pathology Evaluation  Cognitive/Bedside Swallow    Patient Name:  Dalia Ortiz   MRN:  [id number]  Admitting Diagnosis: Acute CVA (cerebrovascular accident)    Recommendations:                  General Recommendations:  Cognitive-linguistic therapy  Diet recommendations:  Regular Diet - IDDSI Level 7, Thin liquids - IDDSI Level 0   Aspiration Precautions: Eliminate distractions, Feed only when awake/alert, Frequent oral care, HOB to 90 degrees, Remain upright 30 minutes post meal, Small bites/sips, and Standard aspiration precautions   General Precautions: Standard, aspiration  Communication strategies:  provide increased time to answer    History:     History reviewed. No pertinent past medical history.    History reviewed. No pertinent surgical history.    Social History: Patient reportedly homeless and drives trucks.    Prior Intubation HX:  N/A    Modified Barium Swallow: N/A    Chest X-Rays: See medical chart.    Prior diet: Pt consumes a regular diet.    Subjective     Pt seen bedside for ST evaluation.  Sleeping upon arrival but easily arousable.  Reported fatigue and noted leaning to right side in bed.  No c/o pain.  No family present.  Patient goals: to improve strength    Pain/Comfort:  Pain Rating 1: 0/10  Pain Rating Post-Intervention 1: 0/10  Pain Rating 2: 0/10  Pain Rating Post-Intervention 2: 0/10    Respiratory Status: Room air    Objective:     Cognitive Status:    Impaired orientation (oriented to person only), memory recall, problem solving/reasoning and safety awareness.       Receptive Language:   Comprehension:   Impaired multi unit y/n, commands (complex), L-R discrimination.  Functional responses to basic questioning; however, responses delayed and required speaker repetition.    Pragmatics:    Flat affect    Expressive Language:  Verbal:    Impaired organization of thought process/sentence formulation.  Anomia also present.    Motor Speech:  WFL    Voice:   WFL    Oral  Musculature Evaluation  Oral Musculature: WFL  Dentition: scattered dentition  Secretion Management: adequate  Mucosal Quality: good  Mandibular Strength and Mobility: WFL  Oral Labial Strength and Mobility: WFL  Lingual Strength and Mobility: WFL  Velar Elevation: WFL  Buccal Strength and Mobility: WFL  Volitional Cough: present  Volitional Swallow: present  Voice Prior to PO Intake: WFL    Bedside Swallow Eval:   Consistencies Assessed:  Pt consumed thin liquids, pureed solids and soft/bite sized solids.  Impulsivity noted     Oral Phase:   WFL    Pharyngeal Phase:   no overt clinical signs/symptoms of aspiration    Compensatory Strategies  None    Treatment: Pt recommended for continued IDDSI 7 (regular solids) with IDDSI 0 (thin liquids), following aspiration precautions.  ST recommended for continued cognitive-linguistic intervention post-acute.    Assessment:     Dalia Ortiz is a 62 y.o. male with an SLP diagnosis of Cognitive-Linguistic Impairment s/p CVA.  He presents with impaired communication/organization of thoughts, disorientation, memory recall and decreased reasoning/safety awareness.  Pt recommended to continue IDDSI 7 (regular solids) with IDDSI 0 (thin liquids), following aspiration precautions.  Pt recommended for continued SLP intervention post-acute.    Goals:   Multidisciplinary Problems       SLP Goals          Problem: SLP    Goal Priority Disciplines Outcome   SLP Goal     SLP    Description: 1.  Pt will consume IDDSI 7 (regular solids) with IDDSI 0 (thin liquids) without incident.  2.  Pt will improve cognitive-linguistic skills to meet functional communicative needs, related to orientation, memory recall and problem solving/reasoning.                       Plan:     Patient to be seen:  2 x/week, 3 x/week   Plan of Care expires:  02/01/23  Plan of Care reviewed with:  patient   SLP Follow-Up:  Yes       Discharge recommendations:  Discharge Facility/Level of Care Needs: rehabilitation  facility   Barriers to Discharge:  None    Time Tracking:     SLP Treatment Date:   01/25/23  Speech Start Time:  1100  Speech Stop Time:  1130     Speech Total Time (min):  30 min    Billable Minutes: Eval 15 minutes  and Eval Swallow and Oral Function 15 minutes    01/25/2023

## 2023-01-26 NOTE — ASSESSMENT & PLAN NOTE
Patient with acute kidney injury likely due to IVVD/dehydration RAGHAVENDRA is currently improving. Labs reviewed- Renal function/electrolytes with Estimated Creatinine Clearance: 70.3 mL/min (based on SCr of 1.2 mg/dL). according to latest data. Monitor urine output and serial BMP and adjust therapy as needed. Avoid nephrotoxins and renally dose meds for GFR listed above.     Creatinine elevated 1.5 on admission (unknown baseline)  Improved with IV fluid  Possibly has a degree of CKD

## 2023-01-27 VITALS
DIASTOLIC BLOOD PRESSURE: 94 MMHG | HEIGHT: 69 IN | RESPIRATION RATE: 18 BRPM | SYSTOLIC BLOOD PRESSURE: 155 MMHG | HEART RATE: 78 BPM | WEIGHT: 190.06 LBS | BODY MASS INDEX: 28.15 KG/M2 | OXYGEN SATURATION: 98 % | TEMPERATURE: 99 F

## 2023-01-27 PROBLEM — R41.82 ALTERED MENTAL STATUS: Status: ACTIVE | Noted: 2023-01-27

## 2023-01-27 LAB
ALBUMIN SERPL BCP-MCNC: 3.1 G/DL (ref 3.5–5.2)
ALP SERPL-CCNC: 65 U/L (ref 55–135)
ALT SERPL W/O P-5'-P-CCNC: 32 U/L (ref 10–44)
ANION GAP SERPL CALC-SCNC: 9 MMOL/L (ref 8–16)
AST SERPL-CCNC: 37 U/L (ref 10–40)
BACTERIA UR CULT: ABNORMAL
BILIRUB SERPL-MCNC: 0.3 MG/DL (ref 0.1–1)
BUN SERPL-MCNC: 15 MG/DL (ref 8–23)
CALCIUM SERPL-MCNC: 9 MG/DL (ref 8.7–10.5)
CHLORIDE SERPL-SCNC: 107 MMOL/L (ref 95–110)
CO2 SERPL-SCNC: 22 MMOL/L (ref 23–29)
CREAT SERPL-MCNC: 1.2 MG/DL (ref 0.5–1.4)
EST. GFR  (NO RACE VARIABLE): >60 ML/MIN/1.73 M^2
GLUCOSE SERPL-MCNC: 88 MG/DL (ref 70–110)
POTASSIUM SERPL-SCNC: 4.1 MMOL/L (ref 3.5–5.1)
PROT SERPL-MCNC: 7.4 G/DL (ref 6–8.4)
SODIUM SERPL-SCNC: 138 MMOL/L (ref 136–145)

## 2023-01-27 PROCEDURE — 36415 COLL VENOUS BLD VENIPUNCTURE: CPT | Performed by: INTERNAL MEDICINE

## 2023-01-27 PROCEDURE — 80053 COMPREHEN METABOLIC PANEL: CPT | Performed by: INTERNAL MEDICINE

## 2023-01-27 PROCEDURE — 25000003 PHARM REV CODE 250: Performed by: INTERNAL MEDICINE

## 2023-01-27 PROCEDURE — 63600175 PHARM REV CODE 636 W HCPCS: Performed by: INTERNAL MEDICINE

## 2023-01-27 PROCEDURE — 97530 THERAPEUTIC ACTIVITIES: CPT

## 2023-01-27 PROCEDURE — 97116 GAIT TRAINING THERAPY: CPT

## 2023-01-27 PROCEDURE — 92507 TX SP LANG VOICE COMM INDIV: CPT

## 2023-01-27 RX ORDER — CEPHALEXIN 500 MG/1
500 CAPSULE ORAL EVERY 12 HOURS
Qty: 20 CAPSULE
Start: 2023-01-27 | End: 2023-02-06

## 2023-01-27 RX ORDER — ATORVASTATIN CALCIUM 40 MG/1
40 TABLET, FILM COATED ORAL DAILY
Qty: 360 TABLET
Start: 2023-01-27 | End: 2024-01-27

## 2023-01-27 RX ORDER — AMLODIPINE BESYLATE 10 MG/1
10 TABLET ORAL DAILY
Status: DISCONTINUED | OUTPATIENT
Start: 2023-01-27 | End: 2023-01-27 | Stop reason: HOSPADM

## 2023-01-27 RX ORDER — CEPHALEXIN 500 MG/1
500 CAPSULE ORAL EVERY 12 HOURS
Status: DISCONTINUED | OUTPATIENT
Start: 2023-01-27 | End: 2023-01-27 | Stop reason: HOSPADM

## 2023-01-27 RX ORDER — CLOPIDOGREL BISULFATE 75 MG/1
75 TABLET ORAL DAILY
Qty: 17 TABLET | Refills: 0
Start: 2023-01-27 | End: 2023-02-13

## 2023-01-27 RX ORDER — ATORVASTATIN CALCIUM 40 MG/1
40 TABLET, FILM COATED ORAL DAILY
Start: 2023-01-27 | End: 2023-01-27 | Stop reason: HOSPADM

## 2023-01-27 RX ORDER — LOSARTAN POTASSIUM 50 MG/1
50 TABLET ORAL DAILY
Start: 2023-01-27 | End: 2024-01-27

## 2023-01-27 RX ORDER — AMLODIPINE BESYLATE 10 MG/1
10 TABLET ORAL DAILY
Start: 2023-01-27 | End: 2024-01-27

## 2023-01-27 RX ORDER — NAPROXEN SODIUM 220 MG/1
81 TABLET, FILM COATED ORAL DAILY
Refills: 0
Start: 2023-01-27 | End: 2023-01-27 | Stop reason: HOSPADM

## 2023-01-27 RX ORDER — LOSARTAN POTASSIUM 50 MG/1
50 TABLET ORAL DAILY
Status: DISCONTINUED | OUTPATIENT
Start: 2023-01-27 | End: 2023-01-27 | Stop reason: HOSPADM

## 2023-01-27 RX ORDER — NAPROXEN SODIUM 220 MG/1
81 TABLET, FILM COATED ORAL DAILY
Refills: 0 | COMMUNITY
Start: 2023-01-27 | End: 2024-01-27

## 2023-01-27 RX ADMIN — HYDRALAZINE HYDROCHLORIDE 5 MG: 20 INJECTION, SOLUTION INTRAMUSCULAR; INTRAVENOUS at 05:01

## 2023-01-27 RX ADMIN — CLOPIDOGREL BISULFATE 75 MG: 75 TABLET ORAL at 08:01

## 2023-01-27 RX ADMIN — AMLODIPINE BESYLATE 10 MG: 10 TABLET ORAL at 08:01

## 2023-01-27 RX ADMIN — LOSARTAN POTASSIUM 50 MG: 50 TABLET, FILM COATED ORAL at 08:01

## 2023-01-27 RX ADMIN — ATORVASTATIN CALCIUM 40 MG: 40 TABLET, FILM COATED ORAL at 08:01

## 2023-01-27 RX ADMIN — CEPHALEXIN 500 MG: 500 CAPSULE ORAL at 08:01

## 2023-01-27 RX ADMIN — ASPIRIN 81 MG CHEWABLE TABLET 81 MG: 81 TABLET CHEWABLE at 08:01

## 2023-01-27 NOTE — PLAN OF CARE
[facility] - Med Surg  Discharge Final Note    Primary Care Provider: Primary Doctor No    Expected Discharge Date: 1/27/2023    Final Discharge Note (most recent)       Final Note - 01/27/23 1217          Final Note    Assessment Type Final Discharge Note     Anticipated Discharge Disposition Rehab Facility        Post-Acute Status    Post-Acute Authorization Placement     Post-Acute Placement Status Set-up Complete/Auth obtained     Discharge Delays None known at this time                     Important Message from Medicare      N/A     Sw met with pt this afternoon to update him on d/c plans to [facility] today; he is agreeable.     Nurse to call report. Pt will need transportation to Rehab.

## 2023-01-27 NOTE — PLAN OF CARE
Patient remains free of falls and injuries during shift. No signs of pain or discomfort noted. IV antibiotics given as ordered. Urinal at bedside.Telemonitoring in place. Call bell within reach. Chart check complete. Plan of care ongoing.

## 2023-01-27 NOTE — PT/OT/SLP PROGRESS
"Occupational Therapy      Patient Name:  Dalia Ortiz   MRN:  [id number]    Patient not seen today secondary to  pt adamantly refused . Pt stated " i'm sleeping, no.''  Africa Goldstein, OT  1/27/2023    "

## 2023-01-27 NOTE — DISCHARGE SUMMARY
[facility] Medicine  Discharge Summary      Patient Name: Dalia Ortiz  MRN: [id number]  [facility]: [id number]  Patient Class: IP- Inpatient  Admission Date: 1/23/2023  Hospital Length of Stay: 3 days  Discharge Date and Time:  01/27/2023 11:42 AM  Attending Physician: Emmy Kaur DO   Discharging Provider: Emmy Kaur DO  Primary Care Provider: Primary Doctor No    Primary Care Team: Networked reference to record PCT     HPI:   Mr. Ortiz is a 62-year-old  male, with unknown medical problems, was brought to the ED by EMS after he was found wandering in the street, confused.  No family members at the bedside.  Patient is a very poor historian, unable to obtain much information out of him.  Patient appears disheveled, room smells of strong urine odor.  CT head is negative for acute intracranial pathology.  UA reveals many WBC clumps, many bacteria, greater than 100 WBCs, 3+ leukocytes, positive nitrites and esterases.  UDS is unremarkable.  Chemistry profile reveals evidence of RAGHAVENDRA, creatinine 1.5, unknown baseline.  Anion gap 17, CO2 21.  CBC is unremarkable.  Started on IV fluids, received IV Rocephin empirically.  BP elevated 171/110.      Admitting diagnosis: RAGHAVENDRA.  Urinary tract infection.  Acute metabolic encephalopathy.      * No surgery found *      Hospital Course:   MRI brain obtained showed findings concerning for right caudate and lentiform nucleus lacunar infarcts, right maxillary sinus near complete opacification.  Neurology consulted, patient started on aspirin, Plavix, high-intensity statin.  PT/OT/SLP consulted and recommend rehab placement.  TTE obtained.  Given encephalopathy and lack of any family present, patient was made full code.  Patient's confusion appears to be greater than what should be seen with CVA as well as UTI.  However unfortunately we do not know what patient's baseline is and there is no family available to contact.  Subsequently they recommended EEG  be done which showed no evidence of epileptic waveforms.  Although patient is awake and alert, he is only oriented to self most of the time although sometimes he recalls that he is in the hospital.  Continues to have some aphasia case management consulted for rehab referral.  Patient accepted at [facility], insurance authorization obtained.  Patient seen examined on the day of discharge.  He is agreeable to discharge plan.  All questions answered to his satisfaction.  Stable for discharge to rehab this time.       Goals of Care Treatment Preferences:  Code Status: Full Code      Consults:   Consults (From admission, onward)          Status Ordering Provider     Inpatient consult to Telemedicine-General Neurology  Once        Provider:  (Not yet assigned)    Completed KENYETTA CLEMENS     Inpatient consult to Social Work  Once        Provider:  (Not yet assigned)    Completed KENYETTA CLEMENS     Inpatient consult to Neurology  Once        Provider:  (Not yet assigned)    Acknowledged JULIETH HINKLE.            * Acute CVA (cerebrovascular accident)  MRI brain revealed findings concerning for right caudate and lentiform nucleus lacunar infarcts  Neurology consulted  Hemoglobin A1c 5.6   Lipid panel  Started on aspirin, Plavix, atorvastatin  He will need to remain on Plavix for 21 days post CVA  Optimize antihypertensive regimen  PT/OT/SLP recommends rehab placement  Accepted at [facility]    RAGHAVENDRA (acute kidney injury)  Patient with acute kidney injury likely due to IVVD/dehydration RAGHAVENDRA is currently improving. Labs reviewed- Renal function/electrolytes with Estimated Creatinine Clearance: 70.3 mL/min (based on SCr of 1.2 mg/dL). according to latest data. Monitor urine output and serial BMP and adjust therapy as needed. Avoid nephrotoxins and renally dose meds for GFR listed above.     Creatinine elevated 1.5 on admission (unknown baseline)  Improved with IV fluid  Possibly has a degree of CKD given uncontrolled  hypertension    Uncontrolled hypertension  Systolic BP elevated at 160-190 range.    Unknown what medications patient takes at home.    Started amlodipine 5 mg and losartan 25 mg daily after he was passed period for permissive hypertension  Likely will need further titration of antihypertensives in rehab  Patient will need to establish with a PCP after discharge from rehab      UTI (urinary tract infection)  no indwelling catheter present on admission  UA positive for nitrite and leukocyte esterase  Urine culture grew pansensitive E coli  Initially started on empiric treatment with IV ceftriaxone 2 g daily, deescalate to Keflex to finish 14 day antibiotic course for treatment of complicated UTI      Acute metabolic encephalopathy  likely multifactorial secondary to severe UTI and acute CVA        Final Active Diagnoses:    Diagnosis Date Noted POA    PRINCIPAL PROBLEM:  Acute CVA (cerebrovascular accident) [I63.9] 01/24/2023 Yes    Altered mental status [R41.82] 01/27/2023 Yes    Acute metabolic encephalopathy [G93.41] 01/24/2023 Yes    UTI (urinary tract infection) [N39.0] 01/24/2023 Yes    Uncontrolled hypertension [I10] 01/24/2023 Yes    RAGHAVENDRA (acute kidney injury) [N17.9] 01/24/2023 Yes      Problems Resolved During this Admission:       Discharged Condition: fair    Disposition: Rehab Facility    Follow Up:    Patient Instructions:      Ambulatory referral/consult to Vascular Neurology   Standing Status: Future   Referral Priority: Routine Referral Type: Consultation   Referral Reason: Specialty Services Required   Requested Specialty: Vascular Neurology   Number of Visits Requested: 1     Ambulatory referral/consult to Family Practice   Standing Status: Future   Referral Priority: Routine Referral Type: Consultation   Referral Reason: Specialty Services Required   Requested Specialty: Family Medicine   Number of Visits Requested: 1     Diet Cardiac   Order Comments: See Stroke Patient Education Guide Booklet for  details.     Call 911 for any of the following:   Order Comments: Call 911  right away if any of the following warning signs come on suddenly, even if the symptoms only last for a few minutes. With stroke, timing is very important.   - Warning Signs of Stroke:  - Weakness: You may feel a sudden weakness, tingling or loss of feeling on one side of your face or body.  - Vision Problems: You may have sudden double vision or trouble seeing in one or both eyes.  - Speech Problems: You may have sudden trouble talking, slured speech, or problems understanding others.  - Headache: You may have sudden, severe headache.  - Movement Problems: You may experience dizziness, a feeling of spinning, a loss of balance, a feeling of falling or blackouts.     Activity as tolerated     Call MD for:  persistent dizziness, light-headedness, or visual disturbances     Call MD for:  increased confusion or weakness       Significant Diagnostic Studies: Labs:   CMP   Recent Labs   Lab 01/27/23  0602      K 4.1      CO2 22*   GLU 88   BUN 15   CREATININE 1.2   CALCIUM 9.0   PROT 7.4   ALBUMIN 3.1*   BILITOT 0.3   ALKPHOS 65   AST 37   ALT 32   ANIONGAP 9   , CBC No results for input(s): WBC, HGB, HCT, PLT in the last 48 hours. and All labs within the past 24 hours have been reviewed    Pending Diagnostic Studies:       Procedure Component Value Units Date/Time    CBC auto differential [[id number]] Collected: 01/23/23 2205    Order Status: Sent Lab Status: In process Updated: 01/23/23 2206    Specimen: Blood     TSH [[id number]] Collected: 01/23/23 2205    Order Status: Sent Lab Status: In process Updated: 01/23/23 2206    Specimen: Blood            Medications:  Reconciled Home Medications:      Medication List        START taking these medications      amLODIPine 10 MG tablet  Commonly known as: NORVASC  Take 1 tablet (10 mg total) by mouth once daily.     aspirin 81 MG Chew  Take 1 tablet (81 mg total) by mouth once daily.      atorvastatin 40 MG tablet  Commonly known as: LIPITOR  Take 1 tablet (40 mg total) by mouth once daily.     cephALEXin 500 MG capsule  Commonly known as: KEFLEX  Take 1 capsule (500 mg total) by mouth every 12 (twelve) hours. for 10 days     clopidogreL 75 mg tablet  Commonly known as: PLAVIX  Take 1 tablet (75 mg total) by mouth once daily. for 17 days     losartan 50 MG tablet  Commonly known as: COZAAR  Take 1 tablet (50 mg total) by mouth once daily.              Indwelling Lines/Drains at time of discharge:   Lines/Drains/Airways       None                   Time spent on the discharge of patient: 40 minutes         Emmy Kaur DO  Department of Hospital Medicine  [facility] - Med Surg

## 2023-01-27 NOTE — PT/OT/SLP PROGRESS
"Physical Therapy  Treatment    Dalia Ortiz   MRN: [id number]   Admitting Diagnosis: Acute CVA (cerebrovascular accident)    PT Received On: 01/27/23  PT Start Time: 0730     PT Stop Time: 0753    PT Total Time (min): 23 min       Billable Minutes:  Gait Training 13 and Therapeutic Activity 10    Treatment Type: Treatment  PT/PTA: PT     PTA Visit Number: 0       General Precautions: Standard, fall  Orthopedic Precautions: N/A  Braces: N/A  Respiratory Status: Room air         Subjective:  Communicated with NURSE YOUNGER AND Epic CHART REVIEW  prior to session.  PT AGREED TO TX     Pain/Comfort  Pain Rating 1: 0/10  Pain Rating Post-Intervention 1: 0/10    Objective:   Patient found with: peripheral IV, telemetry  PT MET IN  COMING OUT OF BATHROOM WITH NURSE. PT T/F TO BEDSIDE WITH SBA. P.T. DONNED GT BELT. PT STOOD WITH NO AD AND GT TRAINED WITH SLOW PACE ANTALGIC GT X 450' WITH CUES FOR DIRECTIONS. PT WITH INC C/O TODAY OF VISUAL DIFFICULTY OF R EYE AS EVIDENCED BY BUMPING INTO THINGS ON RIGHT SIDE. PT RETURNED TO  T/F TO CHAIR WITH SBA. PT COMPLETED AP X 10 REPS HOWEVER DECLINED CONT TE. PT REPORTED, " IM A TRACK STAR." PT LEFT SEATED AND PT EDUCATED ON RISK FOR FALLS DUE TO GENERALIZED WEAKNESS, EDUCATED ON "CALL DON'T FALL", ENCOURAGED TO CALL FOR ASSISTANCE WITH ALL NEEDS SUCH AS BED<>CHAIR TRANSFERS OR TRIPS TO BATHROOM      AM-PAC 6 CLICK MOBILITY  How much help from another person does this patient currently need?   1 = Unable, Total/Dependent Assistance  2 = A lot, Maximum/Moderate Assistance  3 = A little, Minimum/Contact Guard/Supervision  4 = None, Modified [place]/Independent    Turning over in bed (including adjusting bedclothes, sheets and blankets)?: 4  Sitting down on and standing up from a chair with arms (e.g., wheelchair, bedside commode, etc.): 4  Moving from lying on back to sitting on the side of the bed?: 4  Moving to and from a bed to a chair (including a wheelchair)?: 4  Need to " walk in hospital room?: 4  Climbing 3-5 steps with a railing?: 1  Basic Mobility Total Score: 21    AM-PAC Raw Score CMS G-Code Modifier Level of Impairment Assistance   6 % Total / Unable   7 - 9 CM 80 - 100% Maximal Assist   10 - 14 CL 60 - 80% Moderate Assist   15 - 19 CK 40 - 60% Moderate Assist   20 - 22 CJ 20 - 40% Minimal Assist   23 CI 1-20% SBA / CGA   24 CH 0% Independent/ Mod I     Patient left up in chair with call button in reach and chair alarm on.    Assessment:  PT PROGRESSING WITH GT.     Rehab identified problem list/impairments: weakness, gait instability, impaired balance, impaired endurance, impaired cognition, impaired functional mobility, decreased safety awareness, decreased lower extremity function, decreased upper extremity function, visual deficits    Rehab potential is good.    Activity tolerance: Good    Discharge recommendations: rehabilitation facility      Barriers to discharge:      Equipment recommendations: none     GOALS:   Multidisciplinary Problems       Physical Therapy Goals          Problem: Physical Therapy    Goal Priority Disciplines Outcome Goal Variances Interventions   Physical Therapy Goal     PT, PT/OT Ongoing, Progressing     Description: LT23  1. PT WILL COMPLETE BED MOBILITY IND  2. PT WILL T/F TO CHAIR IND  3. PT WILL GT TRAIN X 500' WITH NO AD IND WHILE SCANNING ENVIRONMENT TO PROGRESS FUNC MOBILITY                        PLAN:    Patient to be seen 3 x/week to address the above listed problems via gait training, therapeutic activities, therapeutic exercises  Plan of Care expires: 23  Plan of Care reviewed with: patient         2023

## 2023-01-27 NOTE — PT/OT/SLP PROGRESS
Speech Language Pathology Treatment    Patient Name:  Dalia Ortiz   MRN:  [id number]  Admitting Diagnosis: Acute CVA (cerebrovascular accident)    Recommendations:                 General Recommendations:  Cognitive-linguistic therapy  Diet recommendations:  Regular Diet - IDDSI Level 7, Liquid Diet Level: Thin liquids - IDDSI Level 0   Aspiration Precautions: Feed only when awake/alert, Frequent oral care, and Standard aspiration precautions   General Precautions: Standard, aspiration  Communication strategies:  provide increased time to answer and redirect to tasks    Subjective     Pt seen bedside for ST.  Sitting upright in bedside chair eating lunch.  No c/o pain.  No family present.  Awaiting d/c to [place].  Patient goals: to go to rehab    Pain/Comfort:  Pain Rating 1: 0/10  Pain Rating Post-Intervention 1: 0/10  Pain Rating 2: 0/10  Pain Rating Post-Intervention 2: 0/10    Respiratory Status: Room air    Objective:     Has the patient been evaluated by SLP for swallowing?   Yes  Keep patient NPO? No   Current Respiratory Status: room air    Pt with improved mentation and expression/sentence formation; however, topic maintenance and thought organization remains impaired during conversational speech tasks.  Redirection to tasks required.  Long-term memory retrieval/stories noted mostly during session.  Pt with difficulty recalling recent events or medical POC.  Pt requires assist for problem solving/reasoning tasks.    Assessment:     Dalia Ortiz is a 62 y.o. male with an SLP diagnosis of Cognitive-Linguistic Impairment s/p CVA.  He is recommended to continue IDDSI 7 (regular solids) with IDDSI 0 (thin liquids), following aspiration precautions.  Pt recommended for continued SLP intervention post-acute to improve communicative effectiveness.    Goals:   Multidisciplinary Problems       SLP Goals          Problem: SLP    Goal Priority Disciplines Outcome   SLP Goal     SLP Ongoing, Progressing    Description: 1.  Pt will consume IDDSI 7 (regular solids) with IDDSI 0 (thin liquids) without incident.  2.  Pt will improve cognitive-linguistic skills to meet functional communicative needs, related to orientation, memory recall and problem solving/reasoning.                       Plan:     Patient to be seen:  2 x/week, 3 x/week   Plan of Care expires:  02/01/23  Plan of Care reviewed with:  patient   SLP Follow-Up:  Yes       Discharge recommendations:  rehabilitation facility   Barriers to Discharge:  None    Time Tracking:     SLP Treatment Date:   01/27/23  Speech Start Time:  1300  Speech Stop Time:  1315     Speech Total Time (min):  15 min    Billable Minutes: Speech Therapy Individual 15 minutes    01/27/2023

## 2023-01-27 NOTE — PLAN OF CARE
Patient ready for discharge, IV and telemetry removed as ordered. Discharge instructions reviewed with the patient. Report called to rehab facility. Transportation being arranged, waiting for transportation to  patient.

## 2023-01-27 NOTE — PROCEDURES
DATE: 1/26/23    EEG NUMBER:  [id number]    REFERRING PHYSICIAN:  Dr. Henley     This EEG was performed to assess for evidence of underlying epilepsy.     ELECTROENCEPHALOGRAM REPORT     METHODOLOGY:  Electroencephalographic (EEG) recording is with electrodes placed according to the International 10-20 placement system.  Thirty two (32) channels of digital signal are simultaneously recorded from the scalp and may include EKG, EMG, and/or eye monitors.   Recording band pass was 0.1 to 512 hz.  Digital video recording of the patient is simultaneously recorded with the EEG.  The staff report clinical symptoms and may press an event button when the patient has symptoms of clinical interest to the treating physicians.  EEG and video recording is stored and archived in digital format.  The entire recording is visually reviewed, and the times identified by computer analysis as being spikes or seizures are reviewed again.  Activation procedures which include photic stimulation, hyperventilation and instructing patients to perform simple task are done in selected patients.   Compresses spectral analysis (CSA) is also performed on the activity recorded from each individual channel.  This is displayed as a power display of frequencies from 0 to 30 Hz over time.   The CSA analysis is done and displayed continuously.  This is reviewed for asymmetries in power between homologous areas of the scalp and for presence of changes in power which can be seen when seizures occur.  Sections of suspected abnormalities on the CSA is then compared with the original EEG recording.                [employer] software was also utilized in the review of this study.  This software suite analyzes the EEG recording in multiple domains.  Coherence and rhythmicity is computed to identify EEG sections which may contain organized seizures.  Each channel undergoes analysis to detect presence of spike and sharp waves which have special and morphological  characteristic of epileptic activity.  The routine EEG recording is converted from spacial into frequency domain.  This is then displayed comparing homologous areas to identify areas of significant asymmetry.  Algorithm to identify non-cortically generated artifact is used to separate eye movement, EMG and other artifact from the EEG.     EEG FINDINGS:  The recording was obtained with a number of standard bipolar and referential montages during wakefulness, drowsiness.  In the alert state, the posterior background rhythm was a symmetric, well-modulated 9 Hz alpha rhythm, which reacted symmetrically to eye opening.  Intermittent photic stimulation evoked symmetric posterior driving responses. Hyperventilation produced physiological slowing.  No abnormalities were activated by photic stimulation or hyperventilation.  During drowsiness, the background rhythm waxed and waned and there were periods of slowing.   There were no focal abnormalities.  There were no interictal epileptiform abnormalities and no clinical or electrographic seizures were recorded.    The EKG channel revealed a sinus rhythm.     IMPRESSION:  This is a normal EEG during wakefulness, drowsiness.     CLINICAL CORRELATION:  The patient is a 62 -year-old male who is being evaluated for altered sensorium.  The patient is currently not maintained on any anti-seizure medications.  This is a normal EEG during wakefulness, drowsiness.  There is no evidence for neither cortical dysfunction nor an epileptic process on this recording.  No seizures were recorded during this study.

## 2023-01-27 NOTE — PLAN OF CARE
PT GT TRAINED X 450' WITH NO AD AND CLOSE SBA. PT WITH ANTALGIC GT PATTERN AND INC C/O R EYE VISION

## 2023-01-28 NOTE — ASSESSMENT & PLAN NOTE
Patient with acute kidney injury likely due to IVVD/dehydration RAGHAVENDRA is currently improving. Labs reviewed- Renal function/electrolytes with Estimated Creatinine Clearance: 70.3 mL/min (based on SCr of 1.2 mg/dL). according to latest data. Monitor urine output and serial BMP and adjust therapy as needed. Avoid nephrotoxins and renally dose meds for GFR listed above.     Creatinine elevated 1.5 on admission (unknown baseline)  Improved with IV fluid  Possibly has a degree of CKD given uncontrolled hypertension

## 2023-01-28 NOTE — ASSESSMENT & PLAN NOTE
no indwelling catheter present on admission  UA positive for nitrite and leukocyte esterase  Urine culture grew pansensitive E coli  Initially started on empiric treatment with IV ceftriaxone 2 g daily, deescalate to Augmentin to finish 14 day antibiotic course for treatment of complicated UTI

## 2023-01-28 NOTE — ASSESSMENT & PLAN NOTE
no indwelling catheter present on admission  UA positive for nitrite and leukocyte esterase  Urine culture grew pansensitive E coli  Initially started on empiric treatment with IV ceftriaxone 2 g daily, deescalate to Keflex to finish 14 day antibiotic course for treatment of complicated UTI

## 2023-01-28 NOTE — ASSESSMENT & PLAN NOTE
Systolic BP elevated at 160-190 range.    Unknown what medications patient takes at home.    Started amlodipine 5 mg and losartan 25 mg daily after he was passed period for permissive hypertension  Likely will need further titration of antihypertensives in rehab  Patient will need to establish with a PCP after discharge from rehab

## 2023-01-28 NOTE — ASSESSMENT & PLAN NOTE
MRI brain revealed findings concerning for right caudate and lentiform nucleus lacunar infarcts  Neurology consulted  Hemoglobin A1c 5.6   Lipid panel  Started on aspirin, Plavix, atorvastatin  He will need to remain on Plavix for 21 days post CVA  Optimize antihypertensive regimen  PT/OT/SLP recommends rehab placement  Accepted at [facility]

## 2023-01-29 LAB
BACTERIA BLD CULT: NORMAL
BACTERIA BLD CULT: NORMAL

## 2023-01-30 ENCOUNTER — TELEPHONE (OUTPATIENT)
Dept: PHYSICAL MEDICINE AND REHAB | Facility: CLINIC | Age: 63
End: 2023-01-30
Payer: MEDICAID

## 2023-01-30 NOTE — TELEPHONE ENCOUNTER
Patient on weekly Stroke Core Measures Report. Patient has been discharged to outside rehab facility. No phone call placed at this time.    Emelyn Lin RN

## 2023-03-25 NOTE — H&P
[facility] Emergency [facility].  [facility] Medicine  History & Physical    Patient Name: Dalia Ortiz  MRN: [id number]  Patient Class: Emergency  Admission Date: 1/23/2023  Attending Physician: John Cralson Jr., MD   Primary Care Provider: No primary care provider on file.         Patient information was obtained from patient, past medical records and ER records.     Subjective:     Principal Problem:UTI (urinary tract infection)    Chief Complaint:   Chief Complaint   Patient presents with    Altered Mental Status     Pt found laying in the road with no complaints, knows name and date of birth, does not know year or recent events. Pt unable to stand per ems        HPI: Mr. Ortiz is a 62-year-old  male, with unknown medical problems, was brought to the ED by EMS after he was found wandering in the street, confused.  No family members at the bedside.  Patient is a very poor historian, unable to obtain much information out of him.  Patient appears disheveled, room smells of strong urine odor.  CT head is negative for acute intracranial pathology.  UA reveals many WBC clumps, many bacteria, greater than 100 WBCs, 3+ leukocytes, positive nitrites and esterases.  UDS is unremarkable.  Chemistry profile reveals evidence of RAGHAVENDRA, creatinine 1.5, unknown baseline.  Anion gap 17, CO2 21.  CBC is unremarkable.  Started on IV fluids, received IV Rocephin empirically.  BP elevated 171/110.      Admitting diagnosis: RAGHAVENDRA.  Urinary tract infection.  Acute metabolic encephalopathy.      History reviewed. No pertinent past medical history.    History reviewed. No pertinent surgical history.    Review of patient's allergies indicates:  No Known Allergies    No current facility-administered medications on file prior to encounter.     No current outpatient medications on file prior to encounter.     Family History    Reviewed and Not Pertinent       Tobacco Use    Smoking status: Unknown    Smokeless tobacco: Not on file    Substance and Sexual Activity    Alcohol use: Not on file    Drug use: Not on file    Sexual activity: Not on file     Review of Systems   Unable to perform ROS: Mental status change   Constitutional:  Negative for fever.   Gastrointestinal:  Negative for abdominal pain.   Objective:     Vital Signs (Most Recent):  Temp: 97.7 °F (36.5 °C) (01/23/23 2018)  Pulse: 87 (01/24/23 0024)  Resp: 16 (01/23/23 2018)  BP: (!) 193/115 (01/24/23 0024)  SpO2: 98 % (01/24/23 0024)   Vital Signs (24h Range):  Temp:  [97.7 °F (36.5 °C)] 97.7 °F (36.5 °C)  Pulse:  [87-94] 87  Resp:  [16] 16  SpO2:  [95 %-98 %] 98 %  BP: (171-193)/() 193/115     Weight: 61.2 kg (135 lb)  Body mass index is 19.94 kg/m².    Physical Exam  Vitals and nursing note reviewed.   Constitutional:       General: He is awake. He is not in acute distress.     Appearance: He is ill-appearing.      Comments: Appears confused and disheveled.  Rooms smell of strong urine odor.  No family at the bedside.   HENT:      Head: Normocephalic and atraumatic.      Mouth/Throat:      Mouth: Mucous membranes are dry.   Eyes:      General: No scleral icterus.     Conjunctiva/sclera: Conjunctivae normal.   Cardiovascular:      Rate and Rhythm: Normal rate and regular rhythm.      Heart sounds: No murmur heard.  Pulmonary:      Effort: Pulmonary effort is normal. No respiratory distress.      Breath sounds: Normal breath sounds. No wheezing.   Abdominal:      Palpations: Abdomen is soft.      Tenderness: There is no abdominal tenderness.   Musculoskeletal:         General: No swelling. Normal range of motion.   Skin:     General: Skin is warm.      Coloration: Skin is not jaundiced.   Neurological:      General: No focal deficit present.      Mental Status: He is alert. He is disoriented.      Comments: Alert to place, but not to time.   Psychiatric:         Behavior: Behavior is cooperative.           Significant Labs: All pertinent labs within the past 24 hours have  been reviewed.  BMP:   Recent Labs   Lab 01/23/23  2205   GLU 77      K 3.8      CO2 21*   BUN 15   CREATININE 1.5*   CALCIUM 9.8     CBC:   Recent Labs   Lab 01/24/23  0024   WBC 6.77   HGB 12.6*   HCT 37.5*        CMP:   Recent Labs   Lab 01/23/23 2205      K 3.8      CO2 21*   GLU 77   BUN 15   CREATININE 1.5*   CALCIUM 9.8   PROT 8.5*   ALBUMIN 3.9   BILITOT 1.3*   ALKPHOS 69   AST 46*   ALT 38   ANIONGAP 17*     Urine Studies:   Recent Labs   Lab 01/23/23 2218   COLORU Yellow   APPEARANCEUA Hazy*   PHUR 6.0   SPECGRAV 1.020   PROTEINUA 1+*   GLUCUA Negative   KETONESU Negative   BILIRUBINUA Negative   OCCULTUA Trace*   NITRITE Positive*   UROBILINOGEN 4.0-6.0*   LEUKOCYTESUR 3+*   RBCUA 16*   WBCUA >100*   BACTERIA Many*   HYALINECASTS 5*       Significant Imaging: I have reviewed all pertinent imaging results/findings within the past 24 hours.  I have reviewed and interpreted all pertinent imaging results/findings within the past 24 hours.    Imaging Results              X-Ray Chest AP Portable (Final result)  Result time 01/23/23 22:17:50      Final result by Lisa Grewal MD (01/23/23 22:17:50)                   Impression:      No active finding      Electronically signed by: Lisa Grewal  Date:    01/23/2023  Time:    22:17               Narrative:    EXAMINATION:  XR CHEST AP PORTABLE    CLINICAL HISTORY:  altered mental status;    TECHNIQUE:  Single frontal portable view of the chest was performed.    COMPARISON:  None    FINDINGS:  Lungs are well aerated.  Mediastinal contour normal                                       CT Head Without Contrast (Final result)  Result time 01/23/23 21:34:51      Final result by Lisa Grewal MD (01/23/23 21:34:51)                   Impression:      no acute intracranial finding      Electronically signed by: Lisa Grewal  Date:    01/23/2023  Time:    21:34               Narrative:    EXAMINATION:  CT HEAD WITHOUT  CONTRAST    CLINICAL HISTORY:  Mental status change, unknown cause;    COMPARISON:  None.    FINDINGS:  Automated exposure control technique utilized for diminishing radiation exposure dose iterative technique    No acute intracranial hemorrhage or mass effect.  Maxillary sinusitis likely chronic.  No displaced skull fracture chronic microangiopathic change likely                                    I have independently reviewed and interpreted the EKG.     I have independently reviewed all pertinent labs within the past 24 hours.    I have independently reviewed, visualized and interpreted all pertinent imaging results within the past 24 hours and discussed the findings with the ED physician, John Carlson Jr., MD          Assessment/Plan:     * UTI (urinary tract infection)  -no indwelling catheter present.    -UA reviewed.    -Rocephin 1 g IV b.i.d..    -follow up on cultures.      RAGHAVENDRA (acute kidney injury)  Patient with acute kidney injury likely due to IVVD/dehydration RAGHAVENDRA is currently worsening. Labs reviewed- Renal function/electrolytes with Estimated Creatinine Clearance: 44.8 mL/min (A) (based on SCr of 1.5 mg/dL (H)). according to latest data. Monitor urine output and serial BMP and adjust therapy as needed. Avoid nephrotoxins and renally dose meds for GFR listed above.     -creatinine elevated 1.5 (unknown baseline).    -secondary to UTI.    -continue NS at 125 cc/hour.    -repeat labs in a      Acute metabolic encephalopathy  -likely secondary to severe UTI.    -CT head negative for acute intracranial pathology.    -neurochecks every 4 hours.    -re-evaluate in a.m..      Uncontrolled hypertension  -systolic BP elevated at 160-190 range.    -unknown what medications patient takes at home.    -continue IV hydralazine as needed.    -re-evaluate in a.m..    -resume home medications when reconciled.        VTE Risk Mitigation (From admission, onward)         Ordered     Place sequential compression device   Until discontinued         01/24/23 0031                 The patient is placed in OBSERVATION status.      Carlos Warren MD  Department of Hospital Medicine   '[place] - Emergency Dept.   Patient is a 49y old  Male who presents with a chief complaint of Right forearm gas gangrene (25 Mar 2023 12:17)      SUBJECTIVE / OVERNIGHT EVENTS:  Patient has no new complaints. Denies cp, SOB, abdominal pain, N/V/D     MEDICATIONS  (STANDING):  acetaminophen     Tablet .. 975 milliGRAM(s) Oral every 8 hours  ampicillin/sulbactam  IVPB      ampicillin/sulbactam  IVPB 3 Gram(s) IV Intermittent every 24 hours  aspirin  chewable 81 milliGRAM(s) Oral daily  chlorhexidine 2% Cloths 1 Application(s) Topical daily  collagenase Ointment 1 Application(s) Topical two times a day  dextrose 5%. 1000 milliLiter(s) (50 mL/Hr) IV Continuous <Continuous>  dextrose 5%. 1000 milliLiter(s) (100 mL/Hr) IV Continuous <Continuous>  dextrose 50% Injectable 25 Gram(s) IV Push once  dextrose 50% Injectable 12.5 Gram(s) IV Push once  dextrose 50% Injectable 25 Gram(s) IV Push once  dorzolamide 2%/timolol 0.5% Ophthalmic Solution 1 Drop(s) Both EYES two times a day  epoetin deidre-epbx (RETACRIT) Injectable 29658 Unit(s) SubCutaneous once  glucagon  Injectable 1 milliGRAM(s) IntraMuscular once  insulin glargine Injectable (LANTUS) 4 Unit(s) SubCutaneous at bedtime  insulin lispro (ADMELOG) corrective regimen sliding scale   SubCutaneous at bedtime  insulin lispro (ADMELOG) corrective regimen sliding scale   SubCutaneous three times a day before meals  senna 2 Tablet(s) Oral at bedtime    MEDICATIONS  (PRN):  dextrose Oral Gel 15 Gram(s) Oral once PRN Blood Glucose LESS THAN 70 milliGRAM(s)/deciliter  oxyCODONE    IR 5 milliGRAM(s) Oral every 4 hours PRN Moderate Pain (4 - 6)  oxyCODONE    IR 10 milliGRAM(s) Oral every 4 hours PRN Severe Pain (7 - 10)      Vital Signs Last 24 Hrs  T(C): 36.7 (25 Mar 2023 10:31), Max: 36.7 (25 Mar 2023 10:31)  T(F): 98 (25 Mar 2023 10:31), Max: 98 (25 Mar 2023 10:31)  HR: 72 (25 Mar 2023 10:31) (67 - 72)  BP: 138/49 (25 Mar 2023 10:31) (138/49 - 167/70)  BP(mean): --  RR: 18 (25 Mar 2023 10:31) (18 - 18)  SpO2: 99% (25 Mar 2023 10:31) (99% - 99%)    Parameters below as of 25 Mar 2023 10:31  Patient On (Oxygen Delivery Method): room air      CAPILLARY BLOOD GLUCOSE      POCT Blood Glucose.: 106 mg/dL (25 Mar 2023 16:40)  POCT Blood Glucose.: 176 mg/dL (25 Mar 2023 11:21)  POCT Blood Glucose.: 141 mg/dL (25 Mar 2023 07:25)  POCT Blood Glucose.: 97 mg/dL (24 Mar 2023 22:36)  POCT Blood Glucose.: 168 mg/dL (24 Mar 2023 18:12)    I&O's Summary    24 Mar 2023 07:01  -  25 Mar 2023 07:00  --------------------------------------------------------  IN: 400 mL / OUT: 2400 mL / NET: -2000 mL        PHYSICAL EXAM:   GENERAL: NAD, well-developed  HEAD:  Atraumatic, Normocephalic  EYES: EOMI, PERRLA, conjunctiva and sclera clear  NECK: Supple, No JVD  CHEST/LUNG: Clear to auscultation bilaterally; No wheeze  HEART: Regular rate and rhythm; No murmurs, rubs, or gallops  ABDOMEN: Soft, Nontender, Nondistended; Bowel sounds present  EXTREMITIES:  2+ Peripheral Pulses, No clubbing, cyanosis, or edema  PSYCH: AAOx3  NEUROLOGY: non-focal  SKIN: surgical dressing C/D/I  LABS:                        7.8    5.97  )-----------( 273      ( 24 Mar 2023 13:47 )             25.5     03-24    138  |  96<L>  |  62<H>  ----------------------------<  73  4.9   |  22  |  7.57<H>    Ca    8.4      24 Mar 2023 10:30                RADIOLOGY & ADDITIONAL TESTS:    Imaging Personally Reviewed:    Consultant(s) Notes Reviewed:      Care Discussed with Consultants/Other Providers:

## 2023-05-01 PROBLEM — N17.9 AKI (ACUTE KIDNEY INJURY): Status: RESOLVED | Noted: 2023-01-24 | Resolved: 2023-05-01

## 2023-05-01 PROBLEM — I63.9 ACUTE CVA (CEREBROVASCULAR ACCIDENT): Status: RESOLVED | Noted: 2023-01-24 | Resolved: 2023-05-01

## 2023-05-01 PROBLEM — N39.0 UTI (URINARY TRACT INFECTION): Status: RESOLVED | Noted: 2023-01-24 | Resolved: 2023-05-01
